# Patient Record
Sex: MALE | Race: WHITE | Employment: UNEMPLOYED | URBAN - METROPOLITAN AREA
[De-identification: names, ages, dates, MRNs, and addresses within clinical notes are randomized per-mention and may not be internally consistent; named-entity substitution may affect disease eponyms.]

---

## 2020-12-10 ENCOUNTER — APPOINTMENT (OUTPATIENT)
Dept: GENERAL RADIOLOGY | Age: 55
DRG: 137 | End: 2020-12-10
Payer: MEDICAID

## 2020-12-10 ENCOUNTER — HOSPITAL ENCOUNTER (INPATIENT)
Age: 55
LOS: 6 days | Discharge: HOME | DRG: 137 | End: 2020-12-16
Attending: EMERGENCY MEDICINE | Admitting: INTERNAL MEDICINE
Payer: MEDICAID

## 2020-12-10 PROBLEM — U07.1 COVID-19 VIRUS INFECTION: Status: ACTIVE | Noted: 2020-12-10

## 2020-12-10 PROBLEM — J96.01 ACUTE HYPOXEMIC RESPIRATORY FAILURE (HCC): Status: ACTIVE | Noted: 2020-12-10

## 2020-12-10 PROBLEM — J12.82 PNEUMONIA DUE TO COVID-19 VIRUS: Status: ACTIVE | Noted: 2020-12-10

## 2020-12-10 LAB
ABO/RH: NORMAL
ABSOLUTE EOS #: 0 K/UL (ref 0–0.44)
ABSOLUTE IMMATURE GRANULOCYTE: 0.21 K/UL (ref 0–0.3)
ABSOLUTE LYMPH #: 0.7 K/UL (ref 1.1–3.7)
ABSOLUTE MONO #: 0.49 K/UL (ref 0.1–1.2)
ALBUMIN SERPL-MCNC: 3.3 G/DL (ref 3.5–5.2)
ALBUMIN/GLOBULIN RATIO: 1 (ref 1–2.5)
ALP BLD-CCNC: 52 U/L (ref 40–129)
ALT SERPL-CCNC: 57 U/L (ref 5–41)
ANION GAP SERPL CALCULATED.3IONS-SCNC: 13 MMOL/L (ref 9–17)
ANTIBODY SCREEN: NEGATIVE
ARM BAND NUMBER: NORMAL
AST SERPL-CCNC: 67 U/L
BASOPHILS # BLD: 1 % (ref 0–2)
BASOPHILS ABSOLUTE: 0.07 K/UL (ref 0–0.2)
BILIRUB SERPL-MCNC: 0.4 MG/DL (ref 0.3–1.2)
BNP INTERPRETATION: NORMAL
BUN BLDV-MCNC: 11 MG/DL (ref 6–20)
BUN/CREAT BLD: ABNORMAL (ref 9–20)
C-REACTIVE PROTEIN: 102 MG/L (ref 0–5)
CALCIUM SERPL-MCNC: 8.7 MG/DL (ref 8.6–10.4)
CHLORIDE BLD-SCNC: 98 MMOL/L (ref 98–107)
CO2: 22 MMOL/L (ref 20–31)
CREAT SERPL-MCNC: 0.83 MG/DL (ref 0.7–1.2)
D-DIMER QUANTITATIVE: 1.15 MG/L FEU
DIFFERENTIAL TYPE: ABNORMAL
EOSINOPHILS RELATIVE PERCENT: 0 % (ref 1–4)
EXPIRATION DATE: NORMAL
FERRITIN: 1949 UG/L (ref 30–400)
FERRITIN: 1963 UG/L (ref 30–400)
FIBRINOGEN: 631 MG/DL (ref 140–420)
GFR AFRICAN AMERICAN: >60 ML/MIN
GFR NON-AFRICAN AMERICAN: >60 ML/MIN
GFR SERPL CREATININE-BSD FRML MDRD: ABNORMAL ML/MIN/{1.73_M2}
GFR SERPL CREATININE-BSD FRML MDRD: ABNORMAL ML/MIN/{1.73_M2}
GLUCOSE BLD-MCNC: 108 MG/DL (ref 70–99)
HCT VFR BLD CALC: 40.3 % (ref 40.7–50.3)
HEMOGLOBIN: 13.9 G/DL (ref 13–17)
IMMATURE GRANULOCYTES: 3 %
INR BLD: 1
LACTATE DEHYDROGENASE: 532 U/L (ref 135–225)
LACTIC ACID, WHOLE BLOOD: 1.5 MMOL/L (ref 0.7–2.1)
LACTIC ACID: NORMAL MMOL/L
LYMPHOCYTES # BLD: 10 % (ref 24–43)
MCH RBC QN AUTO: 30.8 PG (ref 25.2–33.5)
MCHC RBC AUTO-ENTMCNC: 34.5 G/DL (ref 28.4–34.8)
MCV RBC AUTO: 89.2 FL (ref 82.6–102.9)
MONOCYTES # BLD: 7 % (ref 3–12)
MORPHOLOGY: NORMAL
NRBC AUTOMATED: 0 PER 100 WBC
PARTIAL THROMBOPLASTIN TIME: 26.7 SEC (ref 20.5–30.5)
PDW BLD-RTO: 12.4 % (ref 11.8–14.4)
PLATELET # BLD: 236 K/UL (ref 138–453)
PLATELET ESTIMATE: ABNORMAL
PMV BLD AUTO: 8.6 FL (ref 8.1–13.5)
POTASSIUM SERPL-SCNC: 4.1 MMOL/L (ref 3.7–5.3)
PRO-BNP: 176 PG/ML
PROCALCITONIN: 0.1 NG/ML
PROTHROMBIN TIME: 10.2 SEC (ref 9–12)
RBC # BLD: 4.52 M/UL (ref 4.21–5.77)
RBC # BLD: ABNORMAL 10*6/UL
SEG NEUTROPHILS: 79 % (ref 36–65)
SEGMENTED NEUTROPHILS ABSOLUTE COUNT: 5.53 K/UL (ref 1.5–8.1)
SODIUM BLD-SCNC: 133 MMOL/L (ref 135–144)
TOTAL PROTEIN: 6.6 G/DL (ref 6.4–8.3)
TROPONIN INTERP: NORMAL
TROPONIN INTERP: NORMAL
TROPONIN T: NORMAL NG/ML
TROPONIN T: NORMAL NG/ML
TROPONIN, HIGH SENSITIVITY: 8 NG/L (ref 0–22)
TROPONIN, HIGH SENSITIVITY: 8 NG/L (ref 0–22)
WBC # BLD: 7 K/UL (ref 3.5–11.3)
WBC # BLD: ABNORMAL 10*3/UL

## 2020-12-10 PROCEDURE — 99254 IP/OBS CNSLTJ NEW/EST MOD 60: CPT | Performed by: INTERNAL MEDICINE

## 2020-12-10 PROCEDURE — U0002 COVID-19 LAB TEST NON-CDC: HCPCS

## 2020-12-10 PROCEDURE — 2500000003 HC RX 250 WO HCPCS: Performed by: INTERNAL MEDICINE

## 2020-12-10 PROCEDURE — 87899 AGENT NOS ASSAY W/OPTIC: CPT

## 2020-12-10 PROCEDURE — 84145 PROCALCITONIN (PCT): CPT

## 2020-12-10 PROCEDURE — 1200000000 HC SEMI PRIVATE

## 2020-12-10 PROCEDURE — 87449 NOS EACH ORGANISM AG IA: CPT

## 2020-12-10 PROCEDURE — 82728 ASSAY OF FERRITIN: CPT

## 2020-12-10 PROCEDURE — 6370000000 HC RX 637 (ALT 250 FOR IP): Performed by: CLINICAL NURSE SPECIALIST

## 2020-12-10 PROCEDURE — 93005 ELECTROCARDIOGRAM TRACING: CPT | Performed by: STUDENT IN AN ORGANIZED HEALTH CARE EDUCATION/TRAINING PROGRAM

## 2020-12-10 PROCEDURE — 84484 ASSAY OF TROPONIN QUANT: CPT

## 2020-12-10 PROCEDURE — 85384 FIBRINOGEN ACTIVITY: CPT

## 2020-12-10 PROCEDURE — U0003 INFECTIOUS AGENT DETECTION BY NUCLEIC ACID (DNA OR RNA); SEVERE ACUTE RESPIRATORY SYNDROME CORONAVIRUS 2 (SARS-COV-2) (CORONAVIRUS DISEASE [COVID-19]), AMPLIFIED PROBE TECHNIQUE, MAKING USE OF HIGH THROUGHPUT TECHNOLOGIES AS DESCRIBED BY CMS-2020-01-R: HCPCS

## 2020-12-10 PROCEDURE — 86140 C-REACTIVE PROTEIN: CPT

## 2020-12-10 PROCEDURE — 71045 X-RAY EXAM CHEST 1 VIEW: CPT

## 2020-12-10 PROCEDURE — 6360000002 HC RX W HCPCS: Performed by: CLINICAL NURSE SPECIALIST

## 2020-12-10 PROCEDURE — 83605 ASSAY OF LACTIC ACID: CPT

## 2020-12-10 PROCEDURE — 86900 BLOOD TYPING SEROLOGIC ABO: CPT

## 2020-12-10 PROCEDURE — XW033E5 INTRODUCTION OF REMDESIVIR ANTI-INFECTIVE INTO PERIPHERAL VEIN, PERCUTANEOUS APPROACH, NEW TECHNOLOGY GROUP 5: ICD-10-PCS | Performed by: INTERNAL MEDICINE

## 2020-12-10 PROCEDURE — 85025 COMPLETE CBC W/AUTO DIFF WBC: CPT

## 2020-12-10 PROCEDURE — 2580000003 HC RX 258: Performed by: INTERNAL MEDICINE

## 2020-12-10 PROCEDURE — 99285 EMERGENCY DEPT VISIT HI MDM: CPT

## 2020-12-10 PROCEDURE — 86901 BLOOD TYPING SEROLOGIC RH(D): CPT

## 2020-12-10 PROCEDURE — 80053 COMPREHEN METABOLIC PANEL: CPT

## 2020-12-10 PROCEDURE — 99222 1ST HOSP IP/OBS MODERATE 55: CPT | Performed by: INTERNAL MEDICINE

## 2020-12-10 PROCEDURE — 85730 THROMBOPLASTIN TIME PARTIAL: CPT

## 2020-12-10 PROCEDURE — 2580000003 HC RX 258: Performed by: CLINICAL NURSE SPECIALIST

## 2020-12-10 PROCEDURE — 83880 ASSAY OF NATRIURETIC PEPTIDE: CPT

## 2020-12-10 PROCEDURE — 85610 PROTHROMBIN TIME: CPT

## 2020-12-10 PROCEDURE — XW13325 TRANSFUSION OF CONVALESCENT PLASMA (NONAUTOLOGOUS) INTO PERIPHERAL VEIN, PERCUTANEOUS APPROACH, NEW TECHNOLOGY GROUP 5: ICD-10-PCS | Performed by: INTERNAL MEDICINE

## 2020-12-10 PROCEDURE — 86850 RBC ANTIBODY SCREEN: CPT

## 2020-12-10 PROCEDURE — 36415 COLL VENOUS BLD VENIPUNCTURE: CPT

## 2020-12-10 PROCEDURE — 83615 LACTATE (LD) (LDH) ENZYME: CPT

## 2020-12-10 PROCEDURE — 85379 FIBRIN DEGRADATION QUANT: CPT

## 2020-12-10 RX ORDER — ONDANSETRON 2 MG/ML
4 INJECTION INTRAMUSCULAR; INTRAVENOUS EVERY 6 HOURS PRN
Status: DISCONTINUED | OUTPATIENT
Start: 2020-12-10 | End: 2020-12-16 | Stop reason: HOSPADM

## 2020-12-10 RX ORDER — PROMETHAZINE HYDROCHLORIDE 12.5 MG/1
12.5 TABLET ORAL EVERY 6 HOURS PRN
Status: DISCONTINUED | OUTPATIENT
Start: 2020-12-10 | End: 2020-12-16 | Stop reason: HOSPADM

## 2020-12-10 RX ORDER — POTASSIUM CHLORIDE 7.45 MG/ML
10 INJECTION INTRAVENOUS PRN
Status: DISCONTINUED | OUTPATIENT
Start: 2020-12-10 | End: 2020-12-16 | Stop reason: HOSPADM

## 2020-12-10 RX ORDER — LEVOTHYROXINE SODIUM 0.1 MG/1
100 TABLET ORAL DAILY
Status: DISCONTINUED | OUTPATIENT
Start: 2020-12-10 | End: 2020-12-16 | Stop reason: HOSPADM

## 2020-12-10 RX ORDER — MAGNESIUM SULFATE 1 G/100ML
1 INJECTION INTRAVENOUS PRN
Status: DISCONTINUED | OUTPATIENT
Start: 2020-12-10 | End: 2020-12-16 | Stop reason: HOSPADM

## 2020-12-10 RX ORDER — DEXAMETHASONE SODIUM PHOSPHATE 10 MG/ML
6 INJECTION INTRAMUSCULAR; INTRAVENOUS DAILY
Status: DISCONTINUED | OUTPATIENT
Start: 2020-12-10 | End: 2020-12-16 | Stop reason: HOSPADM

## 2020-12-10 RX ORDER — 0.9 % SODIUM CHLORIDE 0.9 %
30 INTRAVENOUS SOLUTION INTRAVENOUS PRN
Status: DISCONTINUED | OUTPATIENT
Start: 2020-12-10 | End: 2020-12-16 | Stop reason: HOSPADM

## 2020-12-10 RX ORDER — NICOTINE 21 MG/24HR
1 PATCH, TRANSDERMAL 24 HOURS TRANSDERMAL DAILY PRN
Status: DISCONTINUED | OUTPATIENT
Start: 2020-12-10 | End: 2020-12-16 | Stop reason: HOSPADM

## 2020-12-10 RX ORDER — ACETAMINOPHEN 325 MG/1
650 TABLET ORAL EVERY 6 HOURS PRN
Status: DISCONTINUED | OUTPATIENT
Start: 2020-12-10 | End: 2020-12-16 | Stop reason: HOSPADM

## 2020-12-10 RX ORDER — ACETAMINOPHEN 650 MG/1
650 SUPPOSITORY RECTAL EVERY 6 HOURS PRN
Status: DISCONTINUED | OUTPATIENT
Start: 2020-12-10 | End: 2020-12-16 | Stop reason: HOSPADM

## 2020-12-10 RX ORDER — 0.9 % SODIUM CHLORIDE 0.9 %
20 INTRAVENOUS SOLUTION INTRAVENOUS ONCE
Status: COMPLETED | OUTPATIENT
Start: 2020-12-10 | End: 2020-12-10

## 2020-12-10 RX ORDER — POLYETHYLENE GLYCOL 3350 17 G/17G
17 POWDER, FOR SOLUTION ORAL DAILY PRN
Status: DISCONTINUED | OUTPATIENT
Start: 2020-12-10 | End: 2020-12-16 | Stop reason: HOSPADM

## 2020-12-10 RX ORDER — POTASSIUM CHLORIDE 20 MEQ/1
40 TABLET, EXTENDED RELEASE ORAL PRN
Status: DISCONTINUED | OUTPATIENT
Start: 2020-12-10 | End: 2020-12-16 | Stop reason: HOSPADM

## 2020-12-10 RX ORDER — SODIUM CHLORIDE 0.9 % (FLUSH) 0.9 %
10 SYRINGE (ML) INJECTION EVERY 12 HOURS SCHEDULED
Status: DISCONTINUED | OUTPATIENT
Start: 2020-12-10 | End: 2020-12-16 | Stop reason: HOSPADM

## 2020-12-10 RX ORDER — SODIUM CHLORIDE 0.9 % (FLUSH) 0.9 %
10 SYRINGE (ML) INJECTION PRN
Status: DISCONTINUED | OUTPATIENT
Start: 2020-12-10 | End: 2020-12-16 | Stop reason: HOSPADM

## 2020-12-10 RX ORDER — LEVOTHYROXINE SODIUM 0.1 MG/1
100 TABLET ORAL DAILY
COMMUNITY

## 2020-12-10 RX ORDER — GUAIFENESIN DEXTROMETHORPHAN HYDROBROMIDE ORAL SOLUTION 10; 100 MG/5ML; MG/5ML
5 SOLUTION ORAL EVERY 4 HOURS PRN
Status: DISCONTINUED | OUTPATIENT
Start: 2020-12-10 | End: 2020-12-16 | Stop reason: HOSPADM

## 2020-12-10 RX ADMIN — SODIUM CHLORIDE 20 ML: 9 INJECTION, SOLUTION INTRAVENOUS at 15:39

## 2020-12-10 RX ADMIN — ENOXAPARIN SODIUM 30 MG: 100 INJECTION SUBCUTANEOUS at 15:39

## 2020-12-10 RX ADMIN — LEVOTHYROXINE SODIUM 100 MCG: 100 TABLET ORAL at 15:38

## 2020-12-10 RX ADMIN — SODIUM CHLORIDE, PRESERVATIVE FREE 10 ML: 5 INJECTION INTRAVENOUS at 20:15

## 2020-12-10 RX ADMIN — REMDESIVIR 200 MG: 100 INJECTION, POWDER, LYOPHILIZED, FOR SOLUTION INTRAVENOUS at 20:30

## 2020-12-10 RX ADMIN — DEXAMETHASONE SODIUM PHOSPHATE 6 MG: 10 INJECTION INTRAMUSCULAR; INTRAVENOUS at 12:03

## 2020-12-10 RX ADMIN — ENOXAPARIN SODIUM 30 MG: 100 INJECTION SUBCUTANEOUS at 20:39

## 2020-12-10 ASSESSMENT — ENCOUNTER SYMPTOMS
EYE REDNESS: 0
VOMITING: 0
ABDOMINAL DISTENTION: 0
SORE THROAT: 1
APNEA: 0
EYE DISCHARGE: 0
NAUSEA: 0
SHORTNESS OF BREATH: 1
COUGH: 1
RHINORRHEA: 0
EYE ITCHING: 0
COLOR CHANGE: 0
ABDOMINAL PAIN: 0

## 2020-12-10 NOTE — CARE COORDINATION
Case Management Initial Discharge Plan  Mariluz Stanley,         + covid        Insurance Provider: CYNDY JOHNSONECIARAHUL BILLING    Discharge Planning    [facility] Ar[facility]ements:    [facility]      Receiving oral anticoagulation therapy? No      Potential Assistance Needed:   oxygen       Evaluation: no    Expected Discharge date:   12/14    Patient expects to be discharged to:   [facility]    Transportation provider: félix  Transportation arrangements needed for discharge: No    Readmission Risk              Risk of Unplanned Readmission:        10             Does patient have a readmission risk score greater than 14?: No  If yes, follow-up appointment must be made within 7 days of discharge.      Goals of Care: return to adl without shortness of breath       Discharge Plan: return to [facility]           Electronically signed by Bassem Root RN on 12/10/20 at 5:24 PM EST

## 2020-12-10 NOTE — ED PROVIDER NOTES
[facility]  ED     Emergency Department     Faculty Attestation    I performed a history and physical examination of the patient and discussed management with the resident. I reviewed the residents note and agree with the documented findings and plan of care. Any areas of disagreement are noted on the chart. I was personally present for the key portions of any procedures. I have documented in the chart those procedures where I was not present during the key portions. I have reviewed the emergency nurses triage note. I agree with the chief complaint, past medical history, past surgical history, allergies, medications, social and family history as documented unless otherwise noted below. For Physician Assistant/ Nurse Practitioner cases/documentation I have personally evaluated this patient and have completed at least one if not all key elements of the E/M (history, physical exam, and MDM). Additional findings are as noted. Patient brought in from longterm for shortness of breath. Patient states that he started not feeling well about 1 week ago but the shortness of breath was much worse this morning to the point where he thought he was going to pass out when he got out of bed today. Patient was satting in the mid [de-identified] on room air. He is now up into the mid 90s on a nonrebreather. Patient says he has not had any fevers but has just generally not been feeling well. He denies chest pain. Will get labs including a Covid swab. Will get a chest x-ray and plan to admit patient.       Lni Mccall MD  Attending Emergency  Physician              Ovidio Leslie MD  12/10/20 [phone]

## 2020-12-10 NOTE — ED NOTES
Pt presented to ED for shortness of breath and covid s/s x 1 week. Pt was tested for covid this am but not resulted. Pt denies chest pain. Pt denies cough. Pt states loss of smell. Pt states nausea. Pt denies emesis or diarrhea. Pt alert and oriented x 4. Pt in custody of Steven Gallego.      Musa Vasquez RN  12/10/20 3002

## 2020-12-10 NOTE — ED PROVIDER NOTES
on phone: Not on file     Gets together: Not on file     Attends Restorationism service: Not on file     Active member of club or organization: Not on file     Attends meetings of clubs or organizations: Not on file     Relationship status: Not on file    Intimate partner violence     Fear of current or ex partner: Not on file     Emotionally abused: Not on file     Physically abused: Not on file     Forced sexual activity: Not on file   Other Topics Concern    Not on file   Social History Narrative    Not on file       History reviewed. No pertinent family history. Allergies:  Patient has no known allergies. Home Medications:  Prior to Admission medications    Medication Sig Start Date End Date Taking? Authorizing Provider   levothyroxine (SYNTHROID) 100 MCG tablet Take 100 mcg by mouth Daily   Yes Historical Provider, MD       REVIEW OF SYSTEMS    (2-9 systems for level 4, 10 or more for level 5)      Review of Systems   Constitutional: Positive for chills and fever. HENT: Positive for sore throat. Negative for rhinorrhea. Eyes: Negative for redness, itching and visual disturbance. Respiratory: Positive for cough and shortness of breath. Cardiovascular: Positive for chest pain. Gastrointestinal: Negative for abdominal pain, nausea and vomiting. Musculoskeletal: Positive for arthralgias and myalgias. Allergic/Immunologic: Negative for environmental allergies and food allergies. Neurological: Positive for headaches. Negative for weakness and numbness. PHYSICAL EXAM   (up to 7 for level 4, 8 or more for level 5)      INITIAL VITALS:   /81   Pulse 87   Temp 97.7 °F (36.5 °C) (Oral)   Resp 24   Ht 5' 10\" (1.778 m)   Wt 181 lb (82.1 kg)   SpO2 90%   BMI 25.97 kg/m²     Physical Exam  Vitals signs and nursing note reviewed. Constitutional:       General: He is not in acute distress. Appearance: Normal appearance. He is well-developed.  He is not ill-appearing, toxic-appearing or diaphoretic. HENT:      Head: Normocephalic and atraumatic. Eyes:      General: No scleral icterus. Conjunctiva/sclera: Conjunctivae normal.   Neck:      Musculoskeletal: Neck supple. Cardiovascular:      Rate and Rhythm: Normal rate and regular rhythm. Pulmonary:      Effort: Respiratory distress (mild) present. Breath sounds: Normal breath sounds. No stridor. No wheezing, rhonchi or rales. Abdominal:      General: There is no distension. Palpations: Abdomen is soft. There is no mass. Tenderness: There is no abdominal tenderness. There is no guarding or rebound. Musculoskeletal:      Right lower leg: No edema. Left lower leg: No edema. Skin:     General: Skin is warm and dry. Findings: No rash (over exposed skin). Neurological:      General: No focal deficit present. Mental Status: He is alert and oriented to person, place, and time.    Psychiatric:         Mood and Affect: Mood normal.         Behavior: Behavior normal.         DIAGNOSTICS     PLAN (LABS / IMAGING / EKG):  Orders Placed This Encounter   Procedures    Legionella antigen, urine    Strep Pneumoniae Antigen    Culture, Respiratory, Sputum    XR CHEST PORTABLE    Troponin    COVID-19    Procalcitonin    C-Reactive Protein    Lactate Dehydrogenase    Ferritin    Lactic Acid, Plasma    Brain Natriuretic Peptide    CBC Auto Differential    Comprehensive Metabolic Panel w/ Reflex to MG    Troponin    TSH with Reflex    CBC    Protime-INR    Comprehensive Metabolic Panel w/ Reflex to MG    FERRITIN    HEPATIC FUNCTION PANEL    DIET GENERAL;    Vital signs per unit routine    Notify physician    Up with assistance    Daily weights    Intake and output    Verify informed consent    VITAL SIGNS PER TRANSFUSION PROTOCOL    TRANSFUSION REACTION MANAGEMENT    Full Code    Inpatient consult to Hospitalist    Consult to Infectious Disease    Pharmacy to Dose: Other - See Comments: Please start Remdesivir, thanks    Droplet Plus Isolation    Initiate Oxygen Therapy Protocol    Pulse Oximetry Spot Check    Pulse oximetry, continuous    Nasal Cannula oxygen    EKG 12 Lead    TYPE AND SCREEN    Prepare COVID-19 Convalescent Plasma, 2 Units    PATIENT STATUS (FROM ED OR OR/PROCEDURAL) Inpatient       MEDICATIONS ORDERED:  Orders Placed This Encounter   Medications    dextromethorphan-guaiFENesin (ROBITUSSIN-DM)  MG/5ML liquid 5 mL    OR Linked Order Group     acetaminophen (TYLENOL) tablet 650 mg     acetaminophen (TYLENOL) suppository 650 mg    dexamethasone (DECADRON) injection 6 mg    levothyroxine (SYNTHROID) tablet 100 mcg    sodium chloride flush 0.9 % injection 10 mL    sodium chloride flush 0.9 % injection 10 mL    OR Linked Order Group     potassium chloride (KLOR-CON M) extended release tablet 40 mEq     potassium bicarb-citric acid (EFFER-K) effervescent tablet 40 mEq     potassium chloride 10 mEq/100 mL IVPB (Peripheral Line)    magnesium sulfate 1 g in dextrose 5% 100 mL IVPB    OR Linked Order Group     acetaminophen (TYLENOL) tablet 650 mg     acetaminophen (TYLENOL) suppository 650 mg    polyethylene glycol (GLYCOLAX) packet 17 g    OR Linked Order Group     promethazine (PHENERGAN) tablet 12.5 mg     ondansetron (ZOFRAN) injection 4 mg    nicotine (NICODERM CQ) 21 MG/24HR 1 patch    enoxaparin (LOVENOX) injection 30 mg    0.9 % sodium chloride bolus    FOLLOWED BY Linked Order Group     remdesivir 200 mg in sodium chloride 0.9 % 250 mL IVPB      Order Specific Question:   Antimicrobial Indications      Answer:   Pneumonia (CAP)     remdesivir 100 mg in sodium chloride 0.9 % 250 mL IVPB      Order Specific Question:   Antimicrobial Indications      Answer:   Pneumonia (CAP)    0.9 % sodium chloride bolus    DISCONTD: remdesivir 200 mg in sodium chloride 0.9 % 250 mL IVPB     Order Specific Question:   Antimicrobial Indications     Answer: Other     Order Specific Question:   Other Abx Indication     Answer:   Covid 19 positive    DISCONTD: remdesivir 100 mg in sodium chloride 0.9 % 250 mL IVPB     Order Specific Question:   Antimicrobial Indications     Answer: Other     Order Specific Question:   Other Abx Indication     Answer:   Covid 19 positive    0.9 % sodium chloride bolus       DIAGNOSTIC RESULTS / EMERGENCYDEPARTMENT COURSE / MDM     LABS:  Results for orders placed or performed during the hospital encounter of 12/10/20   Troponin   Result Value Ref Range    Troponin, High Sensitivity 8 0 - 22 ng/L    Troponin T NOT REPORTED <0.03 ng/mL    Troponin Interp NOT REPORTED    COVID-19    Specimen: Other   Result Value Ref Range    SARS-CoV-2          SARS-CoV-2, Rapid DETECTED (A) Not Detected    Source . NASOPHARYNGEAL SWAB     SARS-CoV-2 PENDING    Protime-INR   Result Value Ref Range    Protime 10.2 9.0 - 12.0 sec    INR 1.0    APTT   Result Value Ref Range    PTT 26.7 20.5 - 30.5 sec   Fibrinogen   Result Value Ref Range    Fibrinogen 631 (H) 140 - 420 mg/dL   Procalcitonin   Result Value Ref Range    Procalcitonin 0.10 (H) <0.09 ng/mL   C-Reactive Protein   Result Value Ref Range    .0 (H) 0.0 - 5.0 mg/L   Lactate Dehydrogenase   Result Value Ref Range     (H) 135 - 225 U/L   Ferritin   Result Value Ref Range    Ferritin 1,949 (H) 30 - 400 ug/L   D-Dimer, Quantitative   Result Value Ref Range    D-Dimer, Quant 1.15 mg/L FEU   Lactic Acid, Plasma   Result Value Ref Range    Lactic Acid NOT REPORTED mmol/L    Lactic Acid, Whole Blood 1.5 0.7 - 2.1 mmol/L   Brain Natriuretic Peptide   Result Value Ref Range    Pro- <300 pg/mL    BNP Interpretation Pro-BNP Reference Range:    CBC Auto Differential   Result Value Ref Range    WBC 7.0 3.5 - 11.3 k/uL    RBC 4.52 4.21 - 5.77 m/uL    Hemoglobin 13.9 13.0 - 17.0 g/dL    Hematocrit 40.3 (L) 40.7 - 50.3 %    MCV 89.2 82.6 - 102.9 fL    MCH 30.8 25.2 - 33.5 pg    MCHC 34.5 28.4 - 34.8 g/dL    RDW 12.4 11.8 - 14.4 %    Platelets 281 945 - 664 k/uL    MPV 8.6 8.1 - 13.5 fL    NRBC Automated 0.0 0.0 per 100 WBC    Differential Type NOT REPORTED     WBC Morphology NOT REPORTED     RBC Morphology NOT REPORTED     Platelet Estimate NOT REPORTED     Immature Granulocytes 3 (H) 0 %    Seg Neutrophils 79 (H) 36 - 65 %    Lymphocytes 10 (L) 24 - 43 %    Monocytes 7 3 - 12 %    Eosinophils % 0 (L) 1 - 4 %    Basophils 1 0 - 2 %    Absolute Immature Granulocyte 0.21 0.00 - 0.30 k/uL    Segs Absolute 5.53 1.50 - 8.10 k/uL    Absolute Lymph # 0.70 (L) 1.10 - 3.70 k/uL    Absolute Mono # 0.49 0.10 - 1.20 k/uL    Absolute Eos # 0.00 0.00 - 0.44 k/uL    Basophils Absolute 0.07 0.00 - 0.20 k/uL    Morphology Normal    Comprehensive Metabolic Panel w/ Reflex to MG   Result Value Ref Range    Glucose 108 (H) 70 - 99 mg/dL    BUN 11 6 - 20 mg/dL    CREATININE 0.83 0.70 - 1.20 mg/dL    Bun/Cre Ratio NOT REPORTED 9 - 20    Calcium 8.7 8.6 - 10.4 mg/dL    Sodium 133 (L) 135 - 144 mmol/L    Potassium 4.1 3.7 - 5.3 mmol/L    Chloride 98 98 - 107 mmol/L    CO2 22 20 - 31 mmol/L    Anion Gap 13 9 - 17 mmol/L    Alkaline Phosphatase 52 40 - 129 U/L    ALT 57 (H) 5 - 41 U/L    AST 67 (H) <40 U/L    Total Bilirubin 0.40 0.3 - 1.2 mg/dL    Total Protein 6.6 6.4 - 8.3 g/dL    Alb 3.3 (L) 3.5 - 5.2 g/dL    Albumin/Globulin Ratio 1.0 1.0 - 2.5    GFR Non-African American >60 >60 mL/min    GFR African American >60 >60 mL/min    GFR Comment          GFR Staging NOT REPORTED    Troponin   Result Value Ref Range    Troponin, High Sensitivity 8 0 - 22 ng/L    Troponin T NOT REPORTED <0.03 ng/mL    Troponin Interp NOT REPORTED    FERRITIN   Result Value Ref Range    Ferritin 1,963 (H) 30 - 400 ug/L   TYPE AND SCREEN   Result Value Ref Range    Expiration Date 12/13/2020,2359     Arm Band Number BE [id number]     ABO/Rh O POSITIVE     Antibody Screen NEGATIVE        RADIOLOGY:  XR CHEST PORTABLE   Final Result   Diffuse bilateral ill-defined linear pulmonary opacities could represent   pulmonary edema or atypical pneumonia            EMERGENCY DEPARTMENT COURSE:         MDM: 24-year-old gentleman history of asthma presenting with Covid symptoms for approximate last week. Is an 8 and is likely been exposed to Covid virus. On exam he is nontoxic-appearing and in mild respiratory distress. He is hypoxic on room air down to the mid 80s when I took him off of his submental oxygen but improves quickly after replacing the nonrebreather. Vitals are otherwise unremarkable heart regular rate and rhythm, lungs are clear auscultation bilaterally abdomen soft and nontender. No lower extremity edema noted. Differential diagnosis includes ACS, pneumonia, Covid, CHF, among others. Plan is for cardiac work-up, likely admission. Lab work is consistent with Covid and is Covid swab was positive. Plan is for admission to [facility] for further evaluation and management of his Covid pneumonia. PROCEDURES:  none    CONSULTS:  IP CONSULT TO HOSPITALIST  IP CONSULT TO INFECTIOUS DISEASES  IP CONSULT TO PHARMACY    FINAL IMPRESSION      1. COVID-19          DISPOSITION / PLAN     DISPOSITION Admitted 12/10/2020 11:54:17 AM      PATIENT REFERRED TO:  No follow-up provider specified.     DISCHARGE MEDICATIONS:  Current Discharge Medication List          Jyothi Enamorado DO  Emergency Medicine Resident  [address] [facility]    (Please note that portions of this note were completed with a voice recognition program.  Efforts were made to edit thedictations but occasionally words are mis-transcribed.)       Jyothi Enamorado DO  Resident  12/10/20 8619

## 2020-12-10 NOTE — ED NOTES
Bed: 19  Expected date:   Expected time:   Means of arrival:   Comments:  [facility] [address] [facility]  12/10/20 1016

## 2020-12-11 LAB
ALBUMIN SERPL-MCNC: 3.2 G/DL (ref 3.5–5.2)
ALBUMIN/GLOBULIN RATIO: 0.9 (ref 1–2.5)
ALP BLD-CCNC: 53 U/L (ref 40–129)
ALT SERPL-CCNC: 54 U/L (ref 5–41)
ANION GAP SERPL CALCULATED.3IONS-SCNC: 12 MMOL/L (ref 9–17)
AST SERPL-CCNC: 57 U/L
BILIRUB SERPL-MCNC: 0.33 MG/DL (ref 0.3–1.2)
BUN BLDV-MCNC: 14 MG/DL (ref 6–20)
BUN/CREAT BLD: ABNORMAL (ref 9–20)
CALCIUM SERPL-MCNC: 8.8 MG/DL (ref 8.6–10.4)
CHLORIDE BLD-SCNC: 101 MMOL/L (ref 98–107)
CO2: 21 MMOL/L (ref 20–31)
CREAT SERPL-MCNC: 0.87 MG/DL (ref 0.7–1.2)
EKG ATRIAL RATE: 95 BPM
EKG P AXIS: 32 DEGREES
EKG P-R INTERVAL: 130 MS
EKG Q-T INTERVAL: 356 MS
EKG QRS DURATION: 74 MS
EKG QTC CALCULATION (BAZETT): 447 MS
EKG R AXIS: -5 DEGREES
EKG T AXIS: 17 DEGREES
EKG VENTRICULAR RATE: 95 BPM
GFR AFRICAN AMERICAN: >60 ML/MIN
GFR NON-AFRICAN AMERICAN: >60 ML/MIN
GFR SERPL CREATININE-BSD FRML MDRD: ABNORMAL ML/MIN/{1.73_M2}
GFR SERPL CREATININE-BSD FRML MDRD: ABNORMAL ML/MIN/{1.73_M2}
GLUCOSE BLD-MCNC: 129 MG/DL (ref 70–99)
HCT VFR BLD CALC: 41.5 % (ref 40.7–50.3)
HEMOGLOBIN: 13.5 G/DL (ref 13–17)
INR BLD: 1
LEGIONELLA PNEUMOPHILIA AG, URINE: NEGATIVE
MCH RBC QN AUTO: 30.5 PG (ref 25.2–33.5)
MCHC RBC AUTO-ENTMCNC: 32.5 G/DL (ref 28.4–34.8)
MCV RBC AUTO: 93.7 FL (ref 82.6–102.9)
NRBC AUTOMATED: 0 PER 100 WBC
PDW BLD-RTO: 12.3 % (ref 11.8–14.4)
PLATELET # BLD: 286 K/UL (ref 138–453)
PMV BLD AUTO: 8.8 FL (ref 8.1–13.5)
POTASSIUM SERPL-SCNC: 4.7 MMOL/L (ref 3.7–5.3)
PROTHROMBIN TIME: 10.4 SEC (ref 9–12)
RBC # BLD: 4.43 M/UL (ref 4.21–5.77)
SODIUM BLD-SCNC: 134 MMOL/L (ref 135–144)
SOURCE: NORMAL
STREP PNEUMONIAE ANTIGEN: NEGATIVE
TOTAL PROTEIN: 6.7 G/DL (ref 6.4–8.3)
TSH SERPL DL<=0.05 MIU/L-ACNC: 1.18 MIU/L (ref 0.3–5)
WBC # BLD: 5.6 K/UL (ref 3.5–11.3)

## 2020-12-11 PROCEDURE — 2580000003 HC RX 258: Performed by: INTERNAL MEDICINE

## 2020-12-11 PROCEDURE — 2700000000 HC OXYGEN THERAPY PER DAY

## 2020-12-11 PROCEDURE — 87449 NOS EACH ORGANISM AG IA: CPT

## 2020-12-11 PROCEDURE — 94761 N-INVAS EAR/PLS OXIMETRY MLT: CPT

## 2020-12-11 PROCEDURE — 87899 AGENT NOS ASSAY W/OPTIC: CPT

## 2020-12-11 PROCEDURE — 99233 SBSQ HOSP IP/OBS HIGH 50: CPT | Performed by: INTERNAL MEDICINE

## 2020-12-11 PROCEDURE — 85027 COMPLETE CBC AUTOMATED: CPT

## 2020-12-11 PROCEDURE — 2500000003 HC RX 250 WO HCPCS: Performed by: INTERNAL MEDICINE

## 2020-12-11 PROCEDURE — 1200000000 HC SEMI PRIVATE

## 2020-12-11 PROCEDURE — 84443 ASSAY THYROID STIM HORMONE: CPT

## 2020-12-11 PROCEDURE — 2580000003 HC RX 258: Performed by: CLINICAL NURSE SPECIALIST

## 2020-12-11 PROCEDURE — 80053 COMPREHEN METABOLIC PANEL: CPT

## 2020-12-11 PROCEDURE — 6370000000 HC RX 637 (ALT 250 FOR IP): Performed by: CLINICAL NURSE SPECIALIST

## 2020-12-11 PROCEDURE — 36415 COLL VENOUS BLD VENIPUNCTURE: CPT

## 2020-12-11 PROCEDURE — 6360000002 HC RX W HCPCS: Performed by: CLINICAL NURSE SPECIALIST

## 2020-12-11 PROCEDURE — 85610 PROTHROMBIN TIME: CPT

## 2020-12-11 RX ORDER — 0.9 % SODIUM CHLORIDE 0.9 %
20 INTRAVENOUS SOLUTION INTRAVENOUS ONCE
Status: COMPLETED | OUTPATIENT
Start: 2020-12-11 | End: 2020-12-11

## 2020-12-11 RX ORDER — 0.9 % SODIUM CHLORIDE 0.9 %
20 INTRAVENOUS SOLUTION INTRAVENOUS ONCE
Status: DISCONTINUED | OUTPATIENT
Start: 2020-12-11 | End: 2020-12-11

## 2020-12-11 RX ADMIN — SODIUM CHLORIDE 20 ML: 0.9 INJECTION, SOLUTION INTRAVENOUS at 03:30

## 2020-12-11 RX ADMIN — DEXAMETHASONE SODIUM PHOSPHATE 6 MG: 10 INJECTION INTRAMUSCULAR; INTRAVENOUS at 08:44

## 2020-12-11 RX ADMIN — SODIUM CHLORIDE, PRESERVATIVE FREE 10 ML: 5 INJECTION INTRAVENOUS at 20:27

## 2020-12-11 RX ADMIN — ENOXAPARIN SODIUM 30 MG: 100 INJECTION SUBCUTANEOUS at 08:43

## 2020-12-11 RX ADMIN — ENOXAPARIN SODIUM 30 MG: 100 INJECTION SUBCUTANEOUS at 20:27

## 2020-12-11 RX ADMIN — REMDESIVIR 100 MG: 100 INJECTION, POWDER, LYOPHILIZED, FOR SOLUTION INTRAVENOUS at 20:28

## 2020-12-11 RX ADMIN — SODIUM CHLORIDE, PRESERVATIVE FREE 10 ML: 5 INJECTION INTRAVENOUS at 08:44

## 2020-12-11 RX ADMIN — LEVOTHYROXINE SODIUM 100 MCG: 100 TABLET ORAL at 05:37

## 2020-12-11 ASSESSMENT — ENCOUNTER SYMPTOMS
EYE ITCHING: 0
APNEA: 0
COUGH: 1
EYE DISCHARGE: 0
ABDOMINAL DISTENTION: 0
SHORTNESS OF BREATH: 1
COLOR CHANGE: 0

## 2020-12-11 NOTE — PROGRESS NOTES
[facility] -   Infectious diseases evaluation  admission date 12/10/2020    reason for consultation:   \Covid related respiratory infection    Impression :   Current:  · Covid, pneumonia  · Elevated ferritin  · Change patient  Discussion / summary of stay / plan of care   ·   Recommendations   · Start remdesivir 12/10 until 12/14  · Follow LFTs  · Give 2 units of plasma 12/10, consented and ordered 12/10  · Follow ferritin progress    Infection Control Recommendations   · Strang Precautions  · Contact Isolation   · Airborne isolation  · Droplet Isolation      Antimicrobial Stewardship Recommendations   · Simplification of therapy  · Targeted therapy      Coordination ofOutpatient Care:   · Estimated Length of IV antimicrobials:  · Patient will need Midline / picc Catheter Insertion:   · Patient will need SNF:  · Patient will need outpatient wound care:     History of Present Illness:   Initial history:  Alverta Skiff is a 60y.o.-year-old male presented over the last 1 week before admission with flulike symptoms muscle aches fevers cough shortness of breath not feeling good. Patient is from residential, history of asthma  Brought by EMS and his saturation was 80, chest x-ray showed bilateral infiltrates suggestive of Covid.   Inflammatory markers elevated mostly the ferritin but his creatinine is normal    Patient states he lost the taste of the food, he has no appetite, has no diarrhea    Interval changes  12/11/2020   Patient Vitals for the past 8 hrs:   BP Temp Temp src Pulse Resp SpO2 Weight   12/11/20 1221 -- -- -- -- 20 92 % --   12/11/20 1201 134/87 97.3 °F (36.3 °C) Oral 97 24 94 % --   12/11/20 1131 -- -- -- -- -- 94 % --   12/11/20 0906 -- -- -- -- -- 91 % --   12/11/20 0841 133/84 98.4 °F (36.9 °C) Oral 87 28 (!) 86 % --   12/11/20 0538 -- -- -- -- -- -- 183 lb 14.4 oz (83.4 kg)   12/11/20 0530 137/80 98.4 °F (36.9 °C) Oral 79 22 92 % --   12/11/20 0515 123/77 98 °F (36.7 °C) Oral 78 28 92 % --   12/11/20 0500 122/75 98.3 °F (36.8 °C) Oral 78 28 92 % --   92% saturation 100% FiO2 high flow 35 L  Desaturating on and off,  Received 2 units of plasma,  No new inflammatory markers   normal LFTs      Summary of relevant labs:  Labs:  ALT 57  AST 67  Ferritin 1,949    Procalcitonin 0.10       WBC 7  Micro:  Covid +12/10  Imaging:  Chest x-ray 12/10  Diffuse bilateral ill-defined linear pulmonary opacities could represent   pulmonary edema or atypical pneumonia       I have personally reviewed the past medical history, past surgical history, medications, social history, and family history, and I haveupdated the database accordingly. Allergies:   Patient has no known allergies. Review of Systems:     Review of Systems   Constitutional: Positive for fatigue. Negative for activity change, appetite change and fever. HENT: Negative for congestion. Eyes: Negative for discharge and itching. Respiratory: Positive for cough and shortness of breath. Negative for apnea. Cardiovascular: Negative for chest pain. Gastrointestinal: Negative for abdominal distention. Endocrine: Negative for heat intolerance. Genitourinary: Negative for dysuria and flank pain. Musculoskeletal: Negative for arthralgias. Skin: Negative for color change. Allergic/Immunologic: Negative for immunocompromised state. Neurological: Negative for dizziness. Hematological: Negative for adenopathy. Psychiatric/Behavioral: Negative for agitation. Physical Examination :       Physical Exam  Constitutional:       General: He is not in acute distress. Appearance: Normal appearance. He is ill-appearing. HENT:      Head: Normocephalic and atraumatic. Nose: Nose normal.      Mouth/Throat:      Mouth: Mucous membranes are moist.   Eyes:      General: No scleral icterus.      Conjunctiva/sclera: Conjunctivae normal.      Pupils: Pupils are equal, round, and reactive to light. Neck:      Musculoskeletal: Neck supple. No neck rigidity. Cardiovascular:      Rate and Rhythm: Normal rate and regular rhythm. Heart sounds: Normal heart sounds. No murmur. Pulmonary:      Effort: Respiratory distress present. Breath sounds: Normal breath sounds. Abdominal:      General: There is no distension. Palpations: Abdomen is soft. Genitourinary:     Comments: No Mccall  Musculoskeletal:         General: No swelling or deformity. Skin:     General: Skin is warm. Coloration: Skin is not jaundiced. Neurological:      General: No focal deficit present. Mental Status: He is alert. Mental status is at baseline. Psychiatric:         Mood and Affect: Mood normal.         Thought Content:  Thought content normal.         Past Medical History:     Past Medical History:   Diagnosis Date    Asthma     Hypothyroidism        Past Surgical  History:     Past Surgical History:   Procedure Laterality Date    NOSE SURGERY      Nasal polyps       Medications:      dexamethasone  6 mg Intravenous Daily    levothyroxine  100 mcg Oral Daily    sodium chloride flush  10 mL Intravenous 2 times per day    enoxaparin  30 mg Subcutaneous BID    remdesivir IVPB  100 mg Intravenous Q24H       Social History:     Social History     Socioeconomic History    Marital status: Single     Spouse name: Not on file    Number of children: Not on file    Years of education: Not on file    Highest education level: Not on file   Occupational History    Not on file   Social Needs    Financial resource strain: Not on file    Food insecurity     Worry: Not on file     Inability: Not on file    Transportation needs     Medical: Not on file     Non-medical: Not on file   Tobacco Use    Smoking status: Unknown If Ever Smoked    Smokeless tobacco: Never Used   Substance and Sexual Activity    Alcohol use: Not Currently    Drug use: Not on file    Sexual activity: Not on file Lifestyle    Physical activity     Days per week: Not on file     Minutes per session: Not on file    Stress: Not on file   Relationships    Social connections     Talks on phone: Not on file     Gets together: Not on file     Attends [employer] service: Not on file     Active member of club or organization: Not on file     Attends meetings of clubs or organizations: Not on file     Relationship status: Not on file    Intimate partner violence     Fear of current or ex partner: Not on file     Emotionally abused: Not on file     Physically abused: Not on file     Forced sexual activity: Not on file   Other Topics Concern    Not on file   Social History Narrative    Not on file       Family History:     Family History   Problem Relation Age of Onset    No Known Problems Mother     No Known Problems Father       Medical Decision Making:   I have independently reviewed/ordered the following labs:    CBC with Differential:   Recent Labs     12/10/20  1038 12/11/20  0428   WBC 7.0 5.6   HGB 13.9 13.5   HCT 40.3* 41.5    286   LYMPHOPCT 10*  --    MONOPCT 7  --      BMP:  Recent Labs     12/10/20  1038 12/11/20  0428   * 134*   K 4.1 4.7   CL 98 101   CO2 22 21   BUN 11 14   CREATININE 0.83 0.87     Hepatic Function Panel:   Recent Labs     12/10/20  1038 12/11/20  0428   PROT 6.6 6.7   LABALBU 3.3* 3.2*   BILITOT 0.40 0.33   ALKPHOS 52 53   ALT 57* 54*   AST 67* 57*     No results for input(s): RPR in the last 72 hours. No results for input(s): HIV in the last 72 hours. No results for input(s): BC in the last 72 hours. Lab Results   Component Value Date    CREATININE 0.87 12/11/2020    GLUCOSE 129 12/11/2020       Detailed results: Thank you for allowing us to participate in the care of this patient. Please call with questions.     This note is created with the assistance of a speech recognition program.  While intending to generate adocument that actually reflects the content of the visit, the document can still have some errors including those of syntax and sound a like substitutions which may escape proof reading. It such instances, actual meaningcan be extrapolated by contextual diversion.     Ann Tim MD  Office: [phone]  Perfect serve / office [phone]        '

## 2020-12-11 NOTE — PROGRESS NOTES
Daily    sodium chloride flush  10 mL Intravenous 2 times per day    enoxaparin  30 mg Subcutaneous BID    remdesivir IVPB  100 mg Intravenous Q24H     Continuous Infusions:   PRN Meds: dextromethorphan-guaiFENesin, acetaminophen **OR** acetaminophen, sodium chloride flush, potassium chloride **OR** potassium alternative oral replacement **OR** potassium chloride, magnesium sulfate, acetaminophen **OR** acetaminophen, polyethylene glycol, promethazine **OR** ondansetron, nicotine, sodium chloride, sodium chloride    Data:     Past Medical History:   has a past medical history of Asthma and Hypothyroidism. Social History:   has an unknown smoking status. He has never used smokeless tobacco. He reports previous alcohol use. Family History:   Family History   Problem Relation Age of Onset    No Known Problems Mother     No Known Problems Father        Vitals:  /84   Pulse 87   Temp 98.4 °F (36.9 °C) (Oral)   Resp 28   Ht 5' 10\" (1.778 m)   Wt 183 lb 14.4 oz (83.4 kg)   SpO2 91%   BMI 26.39 kg/m²   Temp (24hrs), Av.4 °F (36.9 °C), Min:97.7 °F (36.5 °C), Max:99.1 °F (37.3 °C)    No results for input(s): POCGLU in the last 72 hours. I/O (24Hr):     Intake/Output Summary (Last 24 hours) at 2020 0931  Last data filed at 2020 0540  Gross per 24 hour   Intake 942.08 ml   Output 850 ml   Net 92.08 ml       Labs:  Hematology:  Recent Labs     12/10/20  1038 20  0428   WBC 7.0 5.6   RBC 4.52 4.43   HGB 13.9 13.5   HCT 40.3* 41.5   MCV 89.2 93.7   MCH 30.8 30.5   MCHC 34.5 32.5   RDW 12.4 12.3    286   MPV 8.6 8.8   .0*  --    INR 1.0 1.0   DDIMER 1.15  --      Chemistry:  Recent Labs     12/10/20  1038 12/10/20  1206 20  0428   *  --  134*   K 4.1  --  4.7   CL 98  --  101   CO2 22  --  21   GLUCOSE 108*  --  129*   BUN 11  --  14   CREATININE 0.83  --  0.87   ANIONGAP 13  --  12   LABGLOM >60  --  >60   GFRAA >60  --  >60   CALCIUM 8.7  --  8.8   PROBNP

## 2020-12-11 NOTE — FLOWSHEET NOTE
Assessment  I had the pleasure of visiting with Mr. Claudia Abad via telephone/window as he was awake and sitting up in his isolation room. After checking with the guards at the door, a cell phone was taken in by the RN as pt is unable to have access to a phone. Intervention  Writer introduced self and pt was able to engage in conversation. Pt has no Islam affiliation. Pt is unable to contact friends or family at this time d/t incarceration. Writer explained POA/LW to pt, as he had no EC or POA listed. Outcome  Pt was grateful for visit. Pt completed POA over the phone. Paperwork is awaiting next trip into room by RN for signature. Chaplains will remain available to offer spiritual and emotional support as needed. 12/11/20 1141   Encounter Summary   Services provided to: Patient   Referral/Consult From: Rounding   Support System Unknown   Continue Visiting   (12/11)   Complexity of Encounter Moderate   Length of Encounter 30 minutes   Advance Care Planning Yes   Routine   Type Initial   Assessment Calm; Approachable   Intervention Active listening   Outcome Expressed gratitude;Receptive   Advance Directives (For Healthcare)   Advance Directives Documents explained; Healthcare power of attornery completed

## 2020-12-11 NOTE — PROGRESS NOTES
Writer spoke with skilled nursing facility to give update. Questions and concerns addressed. Will continue to monitor.

## 2020-12-11 NOTE — PROGRESS NOTES
Second unit of plasma started. RN to stay in room during first 15 min of transfusion. Patient's vitals stable and cycling. No adverse reaction. Patient resting comfortably in bed. Continuing to monitor.

## 2020-12-11 NOTE — H&P
[facility]  Office: [address], DO, Tommy Zamudio, DO, Quinten Signs, DO, Desire Coombs Blood, DO, Xander Melchor MD, Mignon Meckel, MD, Ynes Smith MD, Caterina Lyn MD, Michelle Adams MD, Fernando Bradley MD, Larissa Duncan MD, Florentino Ponce MD, Joanie Szymanski MD, Gricelda Rosas, DO, Jena Lazaro MD, Ronny Mitchell MD, [place], DO, Justin Ruiz MD,  Ramin Crane DO, Remington Winston MD, Michelle Doll MD, Sadia Vela, [facility], [facility] Alison, CNP, Nahomi Stroud, CNP, Himanshu Giraldo, CNS, Maxine Casper, CNP, Susan Sharma, CNP, Dominik Tijerina, CNP, Melissa Guillaume, CNP, Bipin Montoya, CNP, Robin King PA-C, Skylar Bower, [facility], Ana Ritter, CNP, Nora Rodriguez, CNP, Martha Robbins, CNP, Eleno Echols, CNP, Caryle Poet, [facility]    HISTORY AND PHYSICAL EXAMINATION            Date:   12/10/2020  Patient name:  Alex Villa  Date of admission:  12/10/2020 10:16 AM  MRN:   [id number]  Account:  [de-identified]  YOB: 1965  PCP:    No primary care provider on file. Room:   2009/2009-01  Code Status:    Full Code    Chief Complaint:     Chief Complaint   Patient presents with    Shortness of Breath       History Obtained From:     patient    History of Present Illness:     Alex Villa is a 60 y.o. Non-/non  male who presents with Shortness of Breath   and is admitted to the hospital for the management of Pneumonia due to COVID-19 virus. This is a 49-year-old white male who is currently incarcerated in the local detention system. He presents with flulike symptoms with myalgias, fevers and chills, cough and shortness of breath and was concern for COVID-19 due to recent outbreak in the detention system. He is found to be markedly hypoxic and required nonrebreather mask initially for stabilization in the emergency department.   He was tested for Covid and has tested positive and is being admitted for further management. After initial treatment with high flow oxygen he has improved in the wean down to nasal cannula oxygen. He feels less short of breath at this point. He denies any other acute complaints. No treatment was tried prior to arrival.    Past Medical History:     Past Medical History:   Diagnosis Date    Asthma     Hypothyroidism         Past Surgical History:     Past Surgical History:   Procedure Laterality Date    NOSE SURGERY      Nasal polyps        Medications Prior to Admission:     Prior to Admission medications    Medication Sig Start Date End Date Taking? Authorizing Provider   levothyroxine (SYNTHROID) 100 MCG tablet Take 100 mcg by mouth Daily   Yes Historical Provider, MD        Allergies:     Patient has no known allergies. Social History:     Tobacco:    has an unknown smoking status. He has never used smokeless tobacco.  Alcohol:      reports previous alcohol use. Drug Use:  has no history on file for drug. Family History:     Family History   Problem Relation Age of Onset    No Known Problems Mother     No Known Problems Father        Review of Systems:     Positive and Negative as described in HPI.     CONSTITUTIONAL: Positive for fevers, chills, fatigue, negative for sweats, weight loss  HEENT:  negative for vision, hearing changes, runny nose, throat pain  RESPIRATORY: Positive for shortness of breath, cough, congestion, negative for wheezing  CARDIOVASCULAR:  negative for chest pain, palpitations  GASTROINTESTINAL:  negative for nausea, vomiting, diarrhea, constipation, change in bowel habits, abdominal pain   GENITOURINARY:  negative for difficulty of urination, burning with urination, frequency   INTEGUMENT:  negative for rash, skin lesions, easy bruising   HEMATOLOGIC/LYMPHATIC:  negative for swelling/edema   ALLERGIC/IMMUNOLOGIC:  negative for urticaria , itching  ENDOCRINE:  negative increase in drinking, increase in urination, hot or cold intolerance  MUSCULOSKELETAL:  negative joint pains, swelling of joints, positive for myalgias  NEUROLOGICAL:  negative for headaches, dizziness, lightheadedness, numbness, pain, tingling extremities  BEHAVIOR/PSYCH:  negative for depression, anxiety    Physical Exam:   /81   Pulse 94   Temp 98.2 °F (36.8 °C) (Oral)   Resp 23   Ht 5' 10\" (1.778 m)   Wt 181 lb (82.1 kg)   SpO2 92%   BMI 25.97 kg/m²   Temp (24hrs), Av.6 °F (37 °C), Min:97.7 °F (36.5 °C), Max:99.1 °F (37.3 °C)    No results for input(s): POCGLU in the last 72 hours.     Intake/Output Summary (Last 24 hours) at 12/10/2020 2331  Last data filed at 12/10/2020 2020  Gross per 24 hour   Intake 673.33 ml   Output --   Net 673.33 ml       General Appearance: alert, acutely ill appearing, and in no acute distress  Mental status: oriented to person, place, and time  Head: normocephalic, atraumatic  Eye: no icterus, redness, pupils equal and reactive, extraocular eye movements intact, conjunctiva clear  Ear: normal external ear, no discharge, hearing intact  Nose: no drainage noted  Mouth: mucous membranes moist  Neck: supple, no carotid bruits, thyroid not palpable  Lungs: Bilateral equal air entry, clear to ausculation, no wheezing, rales or rhonchi, normal effort, diminished throughout  Cardiovascular: normal rate, regular rhythm, no murmur, gallop, rub  Abdomen: Soft, nontender, nondistended, normal bowel sounds, no hepatomegaly or splenomegaly  Neurologic: There are no new focal motor or sensory deficits, normal muscle tone and bulk, no abnormal sensation, normal speech, cranial nerves II through XII grossly intact  Skin: No gross lesions, rashes, bruising or bleeding on exposed skin area  Extremities: peripheral pulses palpable, no pedal edema or calf pain with palpation  Psych: normal affect    Investigations:      Laboratory Testing:  Recent Results (from the past 24 hour(s))   EKG 12 Lead    Collection Time: 12/10/20 10:26 AM   Result Value Ref Range    Ventricular Rate 95 BPM    Atrial Rate 95 BPM    P-R Interval 130 ms    QRS Duration 74 ms    Q-T Interval 356 ms    QTc Calculation (Bazett) 447 ms    P Axis 32 degrees    R Axis -5 degrees    T Axis 17 degrees   Protime-INR    Collection Time: 12/10/20 10:38 AM   Result Value Ref Range    Protime 10.2 9.0 - 12.0 sec    INR 1.0    APTT    Collection Time: 12/10/20 10:38 AM   Result Value Ref Range    PTT 26.7 20.5 - 30.5 sec   Fibrinogen    Collection Time: 12/10/20 10:38 AM   Result Value Ref Range    Fibrinogen 631 (H) 140 - 420 mg/dL   Procalcitonin    Collection Time: 12/10/20 10:38 AM   Result Value Ref Range    Procalcitonin 0.10 (H) <0.09 ng/mL   C-Reactive Protein    Collection Time: 12/10/20 10:38 AM   Result Value Ref Range    .0 (H) 0.0 - 5.0 mg/L   Lactate Dehydrogenase    Collection Time: 12/10/20 10:38 AM   Result Value Ref Range     (H) 135 - 225 U/L   Ferritin    Collection Time: 12/10/20 10:38 AM   Result Value Ref Range    Ferritin 1,949 (H) 30 - 400 ug/L   D-Dimer, Quantitative    Collection Time: 12/10/20 10:38 AM   Result Value Ref Range    D-Dimer, Quant 1.15 mg/L FEU   Lactic Acid, Plasma    Collection Time: 12/10/20 10:38 AM   Result Value Ref Range    Lactic Acid NOT REPORTED mmol/L    Lactic Acid, Whole Blood 1.5 0.7 - 2.1 mmol/L   Brain Natriuretic Peptide    Collection Time: 12/10/20 10:38 AM   Result Value Ref Range    Pro- <300 pg/mL    BNP Interpretation Pro-BNP Reference Range:    CBC Auto Differential    Collection Time: 12/10/20 10:38 AM   Result Value Ref Range    WBC 7.0 3.5 - 11.3 k/uL    RBC 4.52 4.21 - 5.77 m/uL    Hemoglobin 13.9 13.0 - 17.0 g/dL    Hematocrit 40.3 (L) 40.7 - 50.3 %    MCV 89.2 82.6 - 102.9 fL    MCH 30.8 25.2 - 33.5 pg    MCHC 34.5 28.4 - 34.8 g/dL    RDW 12.4 11.8 - 14.4 %    Platelets 913 967 - 336 k/uL    MPV 8.6 8.1 - 13.5 fL    NRBC Automated 0.0 0.0 per 100 WBC    Differential Type NOT REPORTED     WBC Morphology NOT REPORTED     RBC Morphology NOT REPORTED     Platelet Estimate NOT REPORTED     Immature Granulocytes 3 (H) 0 %    Seg Neutrophils 79 (H) 36 - 65 %    Lymphocytes 10 (L) 24 - 43 %    Monocytes 7 3 - 12 %    Eosinophils % 0 (L) 1 - 4 %    Basophils 1 0 - 2 %    Absolute Immature Granulocyte 0.21 0.00 - 0.30 k/uL    Segs Absolute 5.53 1.50 - 8.10 k/uL    Absolute Lymph # 0.70 (L) 1.10 - 3.70 k/uL    Absolute Mono # 0.49 0.10 - 1.20 k/uL    Absolute Eos # 0.00 0.00 - 0.44 k/uL    Basophils Absolute 0.07 0.00 - 0.20 k/uL    Morphology Normal    Comprehensive Metabolic Panel w/ Reflex to MG    Collection Time: 12/10/20 10:38 AM   Result Value Ref Range    Glucose 108 (H) 70 - 99 mg/dL    BUN 11 6 - 20 mg/dL    CREATININE 0.83 0.70 - 1.20 mg/dL    Bun/Cre Ratio NOT REPORTED 9 - 20    Calcium 8.7 8.6 - 10.4 mg/dL    Sodium 133 (L) 135 - 144 mmol/L    Potassium 4.1 3.7 - 5.3 mmol/L    Chloride 98 98 - 107 mmol/L    CO2 22 20 - 31 mmol/L    Anion Gap 13 9 - 17 mmol/L    Alkaline Phosphatase 52 40 - 129 U/L    ALT 57 (H) 5 - 41 U/L    AST 67 (H) <40 U/L    Total Bilirubin 0.40 0.3 - 1.2 mg/dL    Total Protein 6.6 6.4 - 8.3 g/dL    Alb 3.3 (L) 3.5 - 5.2 g/dL    Albumin/Globulin Ratio 1.0 1.0 - 2.5    GFR Non-African American >60 >60 mL/min    GFR African American >60 >60 mL/min    GFR Comment          GFR Staging NOT REPORTED    Troponin    Collection Time: 12/10/20 10:38 AM   Result Value Ref Range    Troponin, High Sensitivity 8 0 - 22 ng/L    Troponin T NOT REPORTED <0.03 ng/mL    Troponin Interp NOT REPORTED    COVID-19    Collection Time: 12/10/20 10:41 AM    Specimen: Other   Result Value Ref Range    SARS-CoV-2          SARS-CoV-2, Rapid DETECTED (A) Not Detected    Source . NASOPHARYNGEAL SWAB     SARS-CoV-2 PENDING    TYPE AND SCREEN    Collection Time: 12/10/20 10:41 AM   Result Value Ref Range    Expiration Date 12/13/2020,2712     Arm Band Number [id number]     ABO/Rh O POSITIVE     Antibody Screen NEGATIVE Troponin    Collection Time: 12/10/20 12:06 PM   Result Value Ref Range    Troponin, High Sensitivity 8 0 - 22 ng/L    Troponin T NOT REPORTED <0.03 ng/mL    Troponin Interp NOT REPORTED    Prepare COVID-19 Convalescent Plasma, 2 Units    Collection Time: 12/10/20  1:30 PM   Result Value Ref Range    Unit Number [id number]     Product Code Fresh Plasma     Unit Divison 00     Dispense Status ISSUED     Transfusion Status OK TO TRANSFUSE    FERRITIN    Collection Time: 12/10/20  2:35 PM   Result Value Ref Range    Ferritin 1,963 (H) 30 - 400 ug/L       Imaging/Diagnostics:  Xr Chest Portable    Result Date: 12/10/2020  Diffuse bilateral ill-defined linear pulmonary opacities could represent pulmonary edema or atypical pneumonia       Assessment :      Hospital Problems           Last Modified POA    * (Principal) Pneumonia due to COVID-19 virus 12/10/2020 Yes    Hypothyroidism 12/10/2020 Yes    Asthma 12/10/2020 Yes    Acute hypoxemic respiratory failure ([address] due to COVID-19 pneumonia 12/10/2020 Yes          Plan:     Patient status inpatient in the Progressive Unit/Step down    1. Inpatient admission  2. Oxygen and aerosols as needed  3. Infectious disease evaluation  4. IV remdesivir as well as convalescent plasma  5. Decadron as ordered  6. Trend labs  7. GI and DVT prophylaxis  8. Continue home Synthroid dose  9. Activity as tolerated  10. Tylenol as needed for fever  11. Symptomatic management  12. Correct electrolytes as needed    Consultations:   IP CONSULT TO HOSPITALIST  IP CONSULT TO INFECTIOUS DISEASES  IP CONSULT TO PHARMACY     Patient is admitted as inpatient status because of co-morbidities listed above, severity of signs and symptoms as outlined, requirement for current medical therapies and most importantly because of direct risk to patient if care not provided in a hospital setting. Expected length of stay > 48 hours.     Bari Cortez DO  12/10/2020  11:31 PM    Copy sent to Dr. Alexa preciado care provider on file.

## 2020-12-12 LAB
ALBUMIN SERPL-MCNC: 3.2 G/DL (ref 3.5–5.2)
ALBUMIN/GLOBULIN RATIO: 1 (ref 1–2.5)
ALP BLD-CCNC: 58 U/L (ref 40–129)
ALT SERPL-CCNC: 48 U/L (ref 5–41)
AST SERPL-CCNC: 39 U/L
BILIRUB SERPL-MCNC: 0.33 MG/DL (ref 0.3–1.2)
BILIRUBIN DIRECT: 0.12 MG/DL
BILIRUBIN, INDIRECT: 0.21 MG/DL (ref 0–1)
BLD PROD TYP BPU: NORMAL
BLD PROD TYP BPU: NORMAL
DISPENSE STATUS BLOOD BANK: NORMAL
DISPENSE STATUS BLOOD BANK: NORMAL
GLOBULIN: ABNORMAL G/DL (ref 1.5–3.8)
TOTAL PROTEIN: 6.3 G/DL (ref 6.4–8.3)
TRANSFUSION STATUS: NORMAL
TRANSFUSION STATUS: NORMAL
UNIT DIVISION: 0
UNIT DIVISION: 0
UNIT NUMBER: NORMAL
UNIT NUMBER: NORMAL

## 2020-12-12 PROCEDURE — 2500000003 HC RX 250 WO HCPCS: Performed by: INTERNAL MEDICINE

## 2020-12-12 PROCEDURE — 6360000002 HC RX W HCPCS: Performed by: CLINICAL NURSE SPECIALIST

## 2020-12-12 PROCEDURE — 2700000000 HC OXYGEN THERAPY PER DAY

## 2020-12-12 PROCEDURE — 36415 COLL VENOUS BLD VENIPUNCTURE: CPT

## 2020-12-12 PROCEDURE — 94761 N-INVAS EAR/PLS OXIMETRY MLT: CPT

## 2020-12-12 PROCEDURE — 2580000003 HC RX 258: Performed by: CLINICAL NURSE SPECIALIST

## 2020-12-12 PROCEDURE — 1200000000 HC SEMI PRIVATE

## 2020-12-12 PROCEDURE — 99232 SBSQ HOSP IP/OBS MODERATE 35: CPT | Performed by: INTERNAL MEDICINE

## 2020-12-12 PROCEDURE — 2580000003 HC RX 258: Performed by: INTERNAL MEDICINE

## 2020-12-12 PROCEDURE — 80076 HEPATIC FUNCTION PANEL: CPT

## 2020-12-12 PROCEDURE — 6370000000 HC RX 637 (ALT 250 FOR IP): Performed by: CLINICAL NURSE SPECIALIST

## 2020-12-12 PROCEDURE — 99232 SBSQ HOSP IP/OBS MODERATE 35: CPT | Performed by: PHYSICIAN ASSISTANT

## 2020-12-12 RX ADMIN — REMDESIVIR 100 MG: 100 INJECTION, POWDER, LYOPHILIZED, FOR SOLUTION INTRAVENOUS at 20:48

## 2020-12-12 RX ADMIN — LEVOTHYROXINE SODIUM 100 MCG: 100 TABLET ORAL at 05:52

## 2020-12-12 RX ADMIN — ENOXAPARIN SODIUM 30 MG: 100 INJECTION SUBCUTANEOUS at 20:48

## 2020-12-12 RX ADMIN — DEXAMETHASONE SODIUM PHOSPHATE 6 MG: 10 INJECTION INTRAMUSCULAR; INTRAVENOUS at 07:59

## 2020-12-12 RX ADMIN — ENOXAPARIN SODIUM 30 MG: 100 INJECTION SUBCUTANEOUS at 07:59

## 2020-12-12 RX ADMIN — SODIUM CHLORIDE, PRESERVATIVE FREE 10 ML: 5 INJECTION INTRAVENOUS at 08:00

## 2020-12-12 RX ADMIN — SODIUM CHLORIDE, PRESERVATIVE FREE 10 ML: 5 INJECTION INTRAVENOUS at 20:49

## 2020-12-12 NOTE — PROGRESS NOTES
being admitted for further management. After initial treatment with high flow oxygen he has improved in the wean down to nasal cannula oxygen. He feels less short of breath at this point. He denies any other acute complaints. No treatment was tried prior to arrival.    Treated with decadron, remdesivir, plasma. Review of Systems:     Constitutional:  negative for chills, fevers, sweats  Respiratory:  negative for cough, dyspnea on exertion, shortness of breath, wheezing  Cardiovascular:  negative for chest pain, chest pressure/discomfort, lower extremity edema, palpitations  Gastrointestinal:  negative for abdominal pain, constipation, diarrhea, nausea, vomiting  Neurological:  negative for dizziness, headache    Medications: Allergies:  No Known Allergies    Current Meds:   Scheduled Meds:    dexamethasone  6 mg Intravenous Daily    levothyroxine  100 mcg Oral Daily    sodium chloride flush  10 mL Intravenous 2 times per day    enoxaparin  30 mg Subcutaneous BID    remdesivir IVPB  100 mg Intravenous Q24H     Continuous Infusions:   PRN Meds: dextromethorphan-guaiFENesin, acetaminophen **OR** acetaminophen, sodium chloride flush, potassium chloride **OR** potassium alternative oral replacement **OR** potassium chloride, magnesium sulfate, acetaminophen **OR** acetaminophen, polyethylene glycol, promethazine **OR** ondansetron, nicotine, sodium chloride, sodium chloride    Data:     Past Medical History:   has a past medical history of Asthma and Hypothyroidism. Social History:   has an unknown smoking status. He has never used smokeless tobacco. He reports previous alcohol use.      Family History:   Family History   Problem Relation Age of Onset    No Known Problems Mother     No Known Problems Father        Vitals:  /85   Pulse 77   Temp 98.1 °F (36.7 °C) (Oral)   Resp 22   Ht 5' 10\" (1.778 m)   Wt 182 lb 9.6 oz (82.8 kg)   SpO2 93%   BMI 26.20 kg/m²   Temp (24hrs), Av.9 °F (36.6 °C), Min:97.3 °F (36.3 °C), Max:98.1 °F (36.7 °C)    No results for input(s): POCGLU in the last 72 hours. I/O (24Hr):     Intake/Output Summary (Last 24 hours) at 12/12/2020 1038  Last data filed at 12/11/2020 2027  Gross per 24 hour   Intake 610 ml   Output 1300 ml   Net -690 ml       Labs:  Hematology:  Recent Labs     12/10/20  1038 12/11/20  0428   WBC 7.0 5.6   RBC 4.52 4.43   HGB 13.9 13.5   HCT 40.3* 41.5   MCV 89.2 93.7   MCH 30.8 30.5   MCHC 34.5 32.5   RDW 12.4 12.3    286   MPV 8.6 8.8   .0*  --    INR 1.0 1.0   DDIMER 1.15  --      Chemistry:  Recent Labs     12/10/20  1038 12/10/20  1206 12/11/20  0428   *  --  134*   K 4.1  --  4.7   CL 98  --  101   CO2 22  --  21   GLUCOSE 108*  --  129*   BUN 11  --  14   CREATININE 0.83  --  0.87   ANIONGAP 13  --  12   LABGLOM >60  --  >60   GFRAA >60  --  >60   CALCIUM 8.7  --  8.8   PROBNP 176  --   --    TROPHS 8 8  --    LACTACIDWB 1.5  --   --      Recent Labs     12/10/20  1038 12/11/20  0428 12/12/20  0556   PROT 6.6 6.7 6.3*   LABALBU 3.3* 3.2* 3.2*   TSH  --  1.18  --    AST 67* 57* 39   ALT 57* 54* 48*   *  --   --    ALKPHOS 52 53 62   BILITOT 0.40 0.33 0.33   BILIDIR  --   --  0.12     ABG:No results found for: POCPH, PHART, PH, POCPCO2, UQU0FNY, PCO2, POCPO2, PO2ART, PO2, POCHCO3, EHY0DIC, HCO3, NBEA, PBEA, BEART, BE, THGBART, THB, UNN0UXQ, LVAF6XIL, E3YKHAJQ, O2SAT, FIO2  No results found for: SPECIAL  No results found for: CULTURE    Radiology:  Xr Chest Portable    Result Date: 12/10/2020  Diffuse bilateral ill-defined linear pulmonary opacities could represent pulmonary edema or atypical pneumonia       Physical Examination:        General appearance:  alert, cooperative and no distress  Mental Status:  oriented to person, place and time and normal affect  Lungs:  clear to auscultation bilaterally, normal effort  Heart:  regular rate and rhythm, no murmur  Abdomen:  soft, nontender, nondistended, normal bowel sounds, no masses, hepatomegaly, splenomegaly  Extremities:  no edema, redness, tenderness in the calves  Skin:  no gross lesions, rashes, induration    Assessment:        Hospital Problems           Last Modified POA    * (Principal) Pneumonia due to COVID-19 virus 12/10/2020 Yes    Hypothyroidism 12/10/2020 Yes    Asthma 12/10/2020 Yes    Acute hypoxemic respiratory failure ([address] due to COVID-19 pneumonia 12/10/2020 Yes          Plan:        Acute hypoxic respiratory failure secondary to covid-19 pneumonia  - Continue Decadaron, Remdesivir   - ID on board  - On high flow, wean as tolerated     Hypothyroidism  - Continue synthroid      Cough  - Robitussin prm     DVT prophylaxis     Myla Murillo PA-C  12/12/2020  10:38 AM

## 2020-12-12 NOTE — PROGRESS NOTES
[facility]  Progress Note    Monica Lancaster  MRN: [id number]  Date: 2020  LOS: 2     Reason for F/U :   COVID-19 virus infection    Impression :   1. Acute hypoxic respiratory failure secondary to COVID-19 virus infection with associated pneumonia  2. Asthma    Recommendations:   · Continue remdesivir through 2020  · The patient had 2 units of convalescent plasma was ordered  · The patient continues on Decadron therapy  · He remains on high flow O2 by nasal cannula 35 L / 85% FiO2  · Continue supportive care    Infection Control Recommendations:   Droplet plus precautions    Discharge Planning:   Estimated Length of IV antimicrobials: 2020  Patient will need Midline Catheter Insertion/ PICC line Insertion: No  Patient will need: Home IV , [place],  SNF,  LTAC: Undetermined  Patient willneed outpatient wound care: No    Medical Decision making / Summary of Stay:   Monica Lancaster is a 60y.o.-year-old male presented over the last 1 week before admission with flulike symptoms muscle aches fevers cough shortness of breath not feeling good. Patient is from care home, history of asthma  Brought by EMS and his saturation was 80, chest x-ray showed bilateral infiltrates suggestive of Covid. Inflammatory markers elevated mostly the ferritin but his creatinine is normal     Patient states he lost the taste of the food, he has no appetite, has no diarrhea    Current evaluation:2020    /85   Pulse 77   Temp 98.1 °F (36.7 °C) (Oral)   Resp 22   Ht 5' 10\" (1.778 m)   Wt 182 lb 9.6 oz (82.8 kg)   SpO2 93%   BMI 26.20 kg/m²     Temperature Range: Temp: 98.1 °F (36.7 °C) Temp  Av.9 °F (36.6 °C)  Min: 97.3 °F (36.3 °C)  Max: 98.1 °F (36.7 °C)  The patient is in the room and has security guards from the residential outside the door. Due to the fact that he is incarcerated he is not allowed to have a phone and the care was discussed with the nurse.     Review of Systems imaging    Cultures:     Strep Pneumoniae Antigen [[id number]] Collected: 12/11/20 1706   Order Status: Completed Updated: 12/11/20 2349    Source . URINE    Strep pneumo Ag NEGATIVE    Comment: Strep pneumoniae antigen not detected      Legionella antigen, urine [[id number]] Collected: 12/11/20 1706   Order Status: Completed Updated: 12/11/20 2348    Legionella Pneumophilia Ag, Urine NEGATIVE    Comment: L. pneumophila serogroup 1 antigen not detected. A negative result does not exclude infection with Leginella pnemophila serogroup 1 nor does   it rule out other microbial-caused respiratory infections of disease caused by other   serogroups of Legionella pneumophila. Medications:      dexamethasone  6 mg Intravenous Daily    levothyroxine  100 mcg Oral Daily    sodium chloride flush  10 mL Intravenous 2 times per day    enoxaparin  30 mg Subcutaneous BID    remdesivir IVPB  100 mg Intravenous Q24H           [facility]  [address] [facility] [address]  OFFICE: [phone]      Electronically signed by Viv Baez MD on 12/12/2020 at 10:54 AM  Thank you for allowing us to participate in the care of this patient. Please call with questions. This note iscreated with the assistance of a speech recognition program.  While intending to generate a document that actually reflects the content of the visit, the document can still have some errors including those of syntax andsound a like substitutions which may escape proof reading. In such instances, actual meaning can be extrapolated by contextual diversion.

## 2020-12-12 NOTE — PLAN OF CARE
Problem: Airway Clearance - Ineffective  Goal: Achieve or maintain patent airway  12/12/2020 0955 by Jazzmine Burgos RCP  Outcome: Ongoing     Problem: Gas Exchange - Impaired  Goal: Absence of hypoxia  12/12/2020 0955 by Jazzmine Burgos RCP  Outcome: Ongoing     Problem: Gas Exchange - Impaired  Goal: Promote optimal lung function  12/12/2020 0955 by Jazzmine Burgos RCP  Outcome: Ongoing     Problem: Breathing Pattern - Ineffective  Goal: Ability to achieve and maintain a regular respiratory rate  12/12/2020 0955 by Jazzmine Burgos RCP  Outcome: Ongoing

## 2020-12-13 LAB
ABSOLUTE EOS #: 0 K/UL (ref 0–0.44)
ABSOLUTE IMMATURE GRANULOCYTE: 0.61 K/UL (ref 0–0.3)
ABSOLUTE LYMPH #: 1.06 K/UL (ref 1.1–3.7)
ABSOLUTE MONO #: 1.06 K/UL (ref 0.1–1.2)
ALBUMIN SERPL-MCNC: 3 G/DL (ref 3.5–5.2)
ALBUMIN/GLOBULIN RATIO: 1 (ref 1–2.5)
ALP BLD-CCNC: 63 U/L (ref 40–129)
ALT SERPL-CCNC: 73 U/L (ref 5–41)
ANION GAP SERPL CALCULATED.3IONS-SCNC: 11 MMOL/L (ref 9–17)
AST SERPL-CCNC: 43 U/L
BASOPHILS # BLD: 0 % (ref 0–2)
BASOPHILS ABSOLUTE: 0 K/UL (ref 0–0.2)
BILIRUB SERPL-MCNC: 0.39 MG/DL (ref 0.3–1.2)
BUN BLDV-MCNC: 22 MG/DL (ref 6–20)
BUN/CREAT BLD: ABNORMAL (ref 9–20)
C-REACTIVE PROTEIN: 15.3 MG/L (ref 0–5)
CALCIUM SERPL-MCNC: 8.4 MG/DL (ref 8.6–10.4)
CHLORIDE BLD-SCNC: 102 MMOL/L (ref 98–107)
CO2: 23 MMOL/L (ref 20–31)
CREAT SERPL-MCNC: 0.88 MG/DL (ref 0.7–1.2)
CULTURE: NORMAL
DIFFERENTIAL TYPE: ABNORMAL
DIRECT EXAM: NORMAL
EOSINOPHILS RELATIVE PERCENT: 0 % (ref 1–4)
GFR AFRICAN AMERICAN: >60 ML/MIN
GFR NON-AFRICAN AMERICAN: >60 ML/MIN
GFR SERPL CREATININE-BSD FRML MDRD: ABNORMAL ML/MIN/{1.73_M2}
GFR SERPL CREATININE-BSD FRML MDRD: ABNORMAL ML/MIN/{1.73_M2}
GLUCOSE BLD-MCNC: 92 MG/DL (ref 70–99)
HCT VFR BLD CALC: 39 % (ref 40.7–50.3)
HEMOGLOBIN: 12.9 G/DL (ref 13–17)
IMMATURE GRANULOCYTES: 4 %
LYMPHOCYTES # BLD: 7 % (ref 24–43)
Lab: NORMAL
MCH RBC QN AUTO: 30.3 PG (ref 25.2–33.5)
MCHC RBC AUTO-ENTMCNC: 33.1 G/DL (ref 28.4–34.8)
MCV RBC AUTO: 91.5 FL (ref 82.6–102.9)
MONOCYTES # BLD: 7 % (ref 3–12)
MORPHOLOGY: NORMAL
NRBC AUTOMATED: 0 PER 100 WBC
PDW BLD-RTO: 12.5 % (ref 11.8–14.4)
PLATELET # BLD: 385 K/UL (ref 138–453)
PLATELET ESTIMATE: ABNORMAL
PMV BLD AUTO: 8.1 FL (ref 8.1–13.5)
POTASSIUM SERPL-SCNC: 4.7 MMOL/L (ref 3.7–5.3)
RBC # BLD: 4.26 M/UL (ref 4.21–5.77)
RBC # BLD: ABNORMAL 10*6/UL
SEG NEUTROPHILS: 82 % (ref 36–65)
SEGMENTED NEUTROPHILS ABSOLUTE COUNT: 12.47 K/UL (ref 1.5–8.1)
SODIUM BLD-SCNC: 136 MMOL/L (ref 135–144)
SPECIMEN DESCRIPTION: NORMAL
TOTAL PROTEIN: 6.1 G/DL (ref 6.4–8.3)
WBC # BLD: 15.2 K/UL (ref 3.5–11.3)
WBC # BLD: ABNORMAL 10*3/UL

## 2020-12-13 PROCEDURE — 86140 C-REACTIVE PROTEIN: CPT

## 2020-12-13 PROCEDURE — 87205 SMEAR GRAM STAIN: CPT

## 2020-12-13 PROCEDURE — 80053 COMPREHEN METABOLIC PANEL: CPT

## 2020-12-13 PROCEDURE — 6370000000 HC RX 637 (ALT 250 FOR IP): Performed by: CLINICAL NURSE SPECIALIST

## 2020-12-13 PROCEDURE — 2580000003 HC RX 258: Performed by: CLINICAL NURSE SPECIALIST

## 2020-12-13 PROCEDURE — 6360000002 HC RX W HCPCS: Performed by: CLINICAL NURSE SPECIALIST

## 2020-12-13 PROCEDURE — 94761 N-INVAS EAR/PLS OXIMETRY MLT: CPT

## 2020-12-13 PROCEDURE — 87070 CULTURE OTHR SPECIMN AEROBIC: CPT

## 2020-12-13 PROCEDURE — 2700000000 HC OXYGEN THERAPY PER DAY

## 2020-12-13 PROCEDURE — 85025 COMPLETE CBC W/AUTO DIFF WBC: CPT

## 2020-12-13 PROCEDURE — 1200000000 HC SEMI PRIVATE

## 2020-12-13 PROCEDURE — 2500000003 HC RX 250 WO HCPCS: Performed by: INTERNAL MEDICINE

## 2020-12-13 PROCEDURE — 2580000003 HC RX 258: Performed by: INTERNAL MEDICINE

## 2020-12-13 PROCEDURE — APPSS45 APP SPLIT SHARED TIME 31-45 MINUTES: Performed by: NURSE PRACTITIONER

## 2020-12-13 PROCEDURE — 99232 SBSQ HOSP IP/OBS MODERATE 35: CPT | Performed by: NURSE PRACTITIONER

## 2020-12-13 PROCEDURE — 36415 COLL VENOUS BLD VENIPUNCTURE: CPT

## 2020-12-13 RX ADMIN — DEXAMETHASONE SODIUM PHOSPHATE 6 MG: 10 INJECTION INTRAMUSCULAR; INTRAVENOUS at 08:30

## 2020-12-13 RX ADMIN — SODIUM CHLORIDE, PRESERVATIVE FREE 10 ML: 5 INJECTION INTRAVENOUS at 08:32

## 2020-12-13 RX ADMIN — LEVOTHYROXINE SODIUM 100 MCG: 100 TABLET ORAL at 06:21

## 2020-12-13 RX ADMIN — ENOXAPARIN SODIUM 30 MG: 100 INJECTION SUBCUTANEOUS at 21:00

## 2020-12-13 RX ADMIN — ENOXAPARIN SODIUM 30 MG: 100 INJECTION SUBCUTANEOUS at 08:27

## 2020-12-13 RX ADMIN — REMDESIVIR 100 MG: 100 INJECTION, POWDER, LYOPHILIZED, FOR SOLUTION INTRAVENOUS at 21:00

## 2020-12-13 NOTE — PLAN OF CARE
PROVIDE ADEQUATE OXYGENATION WITH ACCEPTABLE SP02/ABG'S  [x]  IDENTIFY APPROPRIATE OXYGEN THERAPY  [x]   MONITOR SP02/ABG'S AS NEEDED   [x]   PATIENT EDUCATION AS NEEDED

## 2020-12-13 NOTE — PROGRESS NOTES
[facility]  Office: [address], DO, Salo Pritchett, DO, Anthony Avelar, DO, Flaco Aaron [facility], DO, Anum Granados MD, Amelie Emanuel MD, Forest Black MD, Ailyn Zhao MD, Morgan Underwood MD, Regina Valerio MD, Sravani Brown MD, Rene Munroe MD, Joanie Fung MD, Bipin Hayes DO, Benita Bernal MD, Rocky Lemus MD, Media Sensor, DO, Maynor Busch MD,  Chely Null DO, Gloria Virgen MD, Priti Caballero MD, Leticia Sanford, CNP, [facility], CNP, Nuris Wu, CNP, Nanci Foley, CNS, Rafa Coronel, CNP, Maricel Sahu, CNP, Olinda Vogt, CNP, Merlinda Goring, CNP, Quinten Gutierrez, CNP, Michelle Cooper PA-C, Anh Carmichael, [facility], Tessie Mendez, CNP, Guerda Washington, CNP, Jesus Murphy, CNP, Melissa Franco, CNP, Natacha Rodriguez, CNP         [facility]   [address] [facility]    Progress Note    12/13/2020    11:39 AM    Name:   Lucy Gu  MRN:     [id number]     Acct:      [de-identified]   Room:   2009/2009-01   Day:  3  Admit Date:  12/10/2020 10:16 AM    PCP:   No primary care provider on file. Code Status:  Full Code    Subjective:     C/C:   Chief Complaint   Patient presents with    Shortness of Breath     Interval History Status: improved. Patient continues to improve. Patient remains on heated high flow oxygen via nasal cannula. Respiratory therapy will be working with the patient today in an effort to wean down his oxygen requirement. Patient reports that he feels much better. Patient reports only intermittent cough and exertional dyspnea. Patient remains on remdesivir, convalescent plasma, and Decadron. Infectious disease continues to follow. Condition is progressing well and we are hopeful he will be able to be weaned from high flow O2 over the next 24 hours. Brief History:     Per EMR:       This is a 78-year-old white male who is currently incarcerated in the local [facility] system.   He presents with flulike symptoms with myalgias, fevers and chills, cough and shortness of breath and was concern for COVID-19 due to recent outbreak in the [facility] system. He is found to be markedly hypoxic and required nonrebreather mask initially for stabilization in the emergency department. He was tested for Covid and has tested positive and is being admitted for further management. After initial treatment with high flow oxygen he has improved in the wean down to nasal cannula oxygen. He feels less short of breath at this point. He denies any other acute complaints. No treatment was tried prior to arrival.    Treated with decadron, remdesivir, plasma. 12/13 -Condition is improving each day and he requires less oxygen today. Review of Systems:     Constitutional:  negative for chills, fevers, sweats  Respiratory:  negative for cough, dyspnea on exertion, shortness of breath, wheezing  Cardiovascular:  negative for chest pain, chest pressure/discomfort, lower extremity edema, palpitations  Gastrointestinal:  negative for abdominal pain, constipation, diarrhea, nausea, vomiting  Neurological:  negative for dizziness, headache    Medications: Allergies:  No Known Allergies    Current Meds:   Scheduled Meds:    dexamethasone  6 mg Intravenous Daily    levothyroxine  100 mcg Oral Daily    sodium chloride flush  10 mL Intravenous 2 times per day    enoxaparin  30 mg Subcutaneous BID    remdesivir IVPB  100 mg Intravenous Q24H     Continuous Infusions:   PRN Meds: dextromethorphan-guaiFENesin, acetaminophen **OR** acetaminophen, sodium chloride flush, potassium chloride **OR** potassium alternative oral replacement **OR** potassium chloride, magnesium sulfate, acetaminophen **OR** acetaminophen, polyethylene glycol, promethazine **OR** ondansetron, nicotine, sodium chloride, sodium chloride    Data:     Past Medical History:   has a past medical history of Asthma and Hypothyroidism.     Social History:   has an unknown smoking status. He has never used smokeless tobacco. He reports previous alcohol use. Family History:   Family History   Problem Relation Age of Onset    No Known Problems Mother     No Known Problems Father        Vitals:  BP (!) 152/88   Pulse 74   Temp 98.1 °F (36.7 °C) (Oral)   Resp 18   Ht 5' 10\" (1.778 m)   Wt 182 lb 9.6 oz (82.8 kg)   SpO2 97%   BMI 26.20 kg/m²   Temp (24hrs), Av.8 °F (36.6 °C), Min:97.5 °F (36.4 °C), Max:98.1 °F (36.7 °C)    No results for input(s): POCGLU in the last 72 hours. I/O (24Hr):     Intake/Output Summary (Last 24 hours) at 2020 1139  Last data filed at 2020 1045  Gross per 24 hour   Intake 460 ml   Output 2050 ml   Net -1590 ml       Labs:  Hematology:  Recent Labs     20  0826   WBC 5.6 15.2*   RBC 4.43 4.26   HGB 13.5 12.9*   HCT 41.5 39.0*   MCV 93.7 91.5   MCH 30.5 30.3   MCHC 32.5 33.1   RDW 12.3 12.5    385   MPV 8.8 8.1   INR 1.0  --      Chemistry:  Recent Labs     12/10/20  1206 20  0428 20  0826   NA  --  134* 136   K  --  4.7 4.7   CL  --  101 102   CO2  --  21 23   GLUCOSE  --  129* 92   BUN  --  14 22*   CREATININE  --  0.87 0.88   ANIONGAP  --   11   LABGLOM  --  >60 >60   GFRAA  --  >60 >60   CALCIUM  --  8.8 8.4*   TROPHS 8  --   --      Recent Labs     20  0428 20  0556 20  0826   PROT 6.7 6.3* 6.1*   LABALBU 3.2* 3.2* 3.0*   TSH 1.18  --   --    AST 57* 39 43*   ALT 54* 48* 73*   ALKPHOS 53 58 61   BILITOT 0.33 0.33 0.39   BILIDIR  --  0.12  --      ABG:No results found for: POCPH, PHART, PH, POCPCO2, DUL9NJD, PCO2, POCPO2, PO2ART, PO2, POCHCO3, SII3ACQ, HCO3, NBEA, PBEA, BEART, BE, THGBART, THB, FTA7GSF, UNBF2GGC, Q7EVFRTE, O2SAT, FIO2  No results found for: SPECIAL  No results found for: CULTURE    Radiology:  Xr Chest Portable    Result Date: 12/10/2020  Diffuse bilateral ill-defined linear pulmonary opacities could represent pulmonary edema or atypical pneumonia Physical Examination:        General appearance:  alert, cooperative and no distress  Mental Status:  oriented to person, place and time and normal affect  Lungs:  clear to auscultation bilaterally, normal effort  Heart:  regular rate and rhythm, no murmur  Abdomen:  soft, nontender, nondistended, normal bowel sounds, no masses, hepatomegaly, splenomegaly  Extremities:  no edema, redness, tenderness in the calves  Skin:  no gross lesions, rashes, induration    Assessment:        Hospital Problems           Last Modified POA    * (Principal) Pneumonia due to COVID-19 virus 12/10/2020 Yes    Hypothyroidism 12/10/2020 Yes    Asthma 12/10/2020 Yes    Acute hypoxemic respiratory failure ([address] due to COVID-19 pneumonia 12/10/2020 Yes          Plan:        Acute hypoxic respiratory failure secondary to covid-19 pneumonia  - Continue Decadaron, Remdesivir   - ID on board  - On high flow, wean as tolerated     Hypothyroidism  - Continue synthroid      Cough  - Robitussin prm         JOEY Torrez NP  12/13/2020  11:39 AM

## 2020-12-13 NOTE — PLAN OF CARE
Problem: Airway Clearance - Ineffective  Goal: Achieve or maintain patent airway  12/13/2020 0825 by Prakash Valencia RCP  Outcome: Ongoing  12/12/2020 2333 by Shraddha Engle RN  Outcome: Ongoing     Problem: Gas Exchange - Impaired  Goal: Absence of hypoxia  12/13/2020 0825 by Prakash Valencia RCP  Outcome: Ongoing  12/12/2020 2333 by Shraddha Engle RN  Outcome: Ongoing  Goal: Promote optimal lung function  12/12/2020 2333 by Shraddha Engle RN  Outcome: Ongoing     Problem: Breathing Pattern - Ineffective  Goal: Ability to achieve and maintain a regular respiratory rate  12/13/2020 0825 by Prakash Valencia RCP  Outcome: Ongoing  12/12/2020 2333 by Shraddha Engle RN  Outcome: Ongoing

## 2020-12-13 NOTE — PLAN OF CARE
Problem: Airway Clearance - Ineffective  Goal: Achieve or maintain patent airway  12/12/2020 2333 by Magalis Lemon RN  Outcome: Ongoing  12/12/2020 0955 by Neto Cruz RCP  Outcome: Ongoing     Problem: Gas Exchange - Impaired  Goal: Absence of hypoxia  12/12/2020 2333 by Magalis Lemon RN  Outcome: Ongoing  12/12/2020 0955 by Neto Cruz RCP  Outcome: Ongoing  Goal: Promote optimal lung function  12/12/2020 2333 by Magalis Lemon RN  Outcome: Ongoing  12/12/2020 0955 by Neto Cruz RCP  Outcome: Ongoing     Problem: Breathing Pattern - Ineffective  Goal: Ability to achieve and maintain a regular respiratory rate  12/12/2020 2333 by Magalis Lemon RN  Outcome: Ongoing  12/12/2020 0955 by Neto Cruz RCP  Outcome: Ongoing     Problem:  Body Temperature -  Risk of, Imbalanced  Goal: Ability to maintain a body temperature within defined limits  Outcome: Ongoing  Goal: Will regain or maintain usual level of consciousness  Outcome: Ongoing  Goal: Complications related to the disease process, condition or treatment will be avoided or minimized  Outcome: Ongoing     Problem: Isolation Precautions - Risk of Spread of Infection  Goal: Prevent transmission of infection  Outcome: Ongoing     Problem: Nutrition Deficits  Goal: Optimize nutrtional status  Outcome: Ongoing     Problem: Risk for Fluid Volume Deficit  Goal: Maintain normal heart rhythm  Outcome: Ongoing  Goal: Maintain absence of muscle cramping  Outcome: Ongoing  Goal: Maintain normal serum potassium, sodium, calcium, phosphorus, and pH  Outcome: Ongoing     Problem: Loneliness or Risk for Loneliness  Goal: Demonstrate positive use of time alone when socialization is not possible  Outcome: Ongoing     Problem: Fatigue  Goal: Verbalize increase energy and improved vitality  Outcome: Ongoing     Problem: Patient Education: Go to Patient Education Activity  Goal: Patient/Family Education  Outcome: Ongoing

## 2020-12-14 LAB
CULTURE: ABNORMAL
DIRECT EXAM: ABNORMAL
Lab: ABNORMAL
SPECIMEN DESCRIPTION: ABNORMAL

## 2020-12-14 PROCEDURE — 6370000000 HC RX 637 (ALT 250 FOR IP): Performed by: CLINICAL NURSE SPECIALIST

## 2020-12-14 PROCEDURE — 94761 N-INVAS EAR/PLS OXIMETRY MLT: CPT

## 2020-12-14 PROCEDURE — 2700000000 HC OXYGEN THERAPY PER DAY

## 2020-12-14 PROCEDURE — 6360000002 HC RX W HCPCS: Performed by: CLINICAL NURSE SPECIALIST

## 2020-12-14 PROCEDURE — 2580000003 HC RX 258: Performed by: INTERNAL MEDICINE

## 2020-12-14 PROCEDURE — 99232 SBSQ HOSP IP/OBS MODERATE 35: CPT | Performed by: INTERNAL MEDICINE

## 2020-12-14 PROCEDURE — 2580000003 HC RX 258: Performed by: CLINICAL NURSE SPECIALIST

## 2020-12-14 PROCEDURE — 1200000000 HC SEMI PRIVATE

## 2020-12-14 PROCEDURE — 99232 SBSQ HOSP IP/OBS MODERATE 35: CPT | Performed by: PHYSICIAN ASSISTANT

## 2020-12-14 PROCEDURE — 2500000003 HC RX 250 WO HCPCS: Performed by: INTERNAL MEDICINE

## 2020-12-14 RX ADMIN — REMDESIVIR 100 MG: 100 INJECTION, POWDER, LYOPHILIZED, FOR SOLUTION INTRAVENOUS at 21:55

## 2020-12-14 RX ADMIN — SODIUM CHLORIDE, PRESERVATIVE FREE 10 ML: 5 INJECTION INTRAVENOUS at 08:52

## 2020-12-14 RX ADMIN — ENOXAPARIN SODIUM 30 MG: 100 INJECTION SUBCUTANEOUS at 08:52

## 2020-12-14 RX ADMIN — LEVOTHYROXINE SODIUM 100 MCG: 100 TABLET ORAL at 08:51

## 2020-12-14 RX ADMIN — ENOXAPARIN SODIUM 30 MG: 100 INJECTION SUBCUTANEOUS at 21:55

## 2020-12-14 RX ADMIN — SODIUM CHLORIDE, PRESERVATIVE FREE 10 ML: 5 INJECTION INTRAVENOUS at 21:59

## 2020-12-14 RX ADMIN — DEXAMETHASONE SODIUM PHOSPHATE 6 MG: 10 INJECTION INTRAMUSCULAR; INTRAVENOUS at 08:51

## 2020-12-14 NOTE — CARE COORDINATION
TRANSITIONAL CARE PLANNING/ 2 [facility] Day: 4    Reason for Admission: COVID-19 virus infection [U07.1]     Treatment Plan of Care: O2 @4L per nc, last dose of Remdesivir today,Decadron. Readmission Risk              Risk of Unplanned Readmission:        15            Patient goals/Treatment Preferences/Transitional Plan:   Return to [facility] facility per custodial guards.

## 2020-12-14 NOTE — PROGRESS NOTES
management. After initial treatment with high flow oxygen he has improved in the wean down to nasal cannula oxygen. He feels less short of breath at this point. He denies any other acute complaints. No treatment was tried prior to arrival.    Treated with decadron, remdesivir, plasma. Review of Systems:     Constitutional:  negative for chills, fevers, sweats  Respiratory:  negative for cough, dyspnea on exertion, shortness of breath, wheezing  Cardiovascular:  negative for chest pain, chest pressure/discomfort, lower extremity edema, palpitations  Gastrointestinal:  negative for abdominal pain, constipation, diarrhea, nausea, vomiting  Neurological:  negative for dizziness, headache    Medications: Allergies:  No Known Allergies    Current Meds:   Scheduled Meds:    dexamethasone  6 mg Intravenous Daily    levothyroxine  100 mcg Oral Daily    sodium chloride flush  10 mL Intravenous 2 times per day    enoxaparin  30 mg Subcutaneous BID    remdesivir IVPB  100 mg Intravenous Q24H     Continuous Infusions:   PRN Meds: dextromethorphan-guaiFENesin, acetaminophen **OR** acetaminophen, sodium chloride flush, potassium chloride **OR** potassium alternative oral replacement **OR** potassium chloride, magnesium sulfate, acetaminophen **OR** acetaminophen, polyethylene glycol, promethazine **OR** ondansetron, nicotine, sodium chloride, sodium chloride    Data:     Past Medical History:   has a past medical history of Asthma and Hypothyroidism. Social History:   has an unknown smoking status. He has never used smokeless tobacco. He reports previous alcohol use.      Family History:   Family History   Problem Relation Age of Onset    No Known Problems Mother     No Known Problems Father        Vitals:  BP (!) 174/116   Pulse 72   Temp 98.2 °F (36.8 °C) (Oral)   Resp 22   Ht 5' 10\" (1.778 m)   Wt 181 lb 6.4 oz (82.3 kg)   SpO2 92%   BMI 26.03 kg/m²   Temp (24hrs), Av.4 °F (36.9 °C), Min:98.2 °F (36.8 °C), Max:98.5 °F (36.9 °C)    No results for input(s): POCGLU in the last 72 hours. I/O (24Hr):     Intake/Output Summary (Last 24 hours) at 12/14/2020 1715  Last data filed at 12/14/2020 0901  Gross per 24 hour   Intake --   Output 400 ml   Net -400 ml       Labs:  Hematology:  Recent Labs     12/13/20  0826   WBC 15.2*   RBC 4.26   HGB 12.9*   HCT 39.0*   MCV 91.5   MCH 30.3   MCHC 33.1   RDW 12.5      MPV 8.1   CRP 15.3*     Chemistry:  Recent Labs     12/13/20  0826      K 4.7      CO2 23   GLUCOSE 92   BUN 22*   CREATININE 0.88   ANIONGAP 11   LABGLOM >60   GFRAA >60   CALCIUM 8.4*     Recent Labs     12/12/20  0556 12/13/20  0826   PROT 6.3* 6.1*   LABALBU 3.2* 3.0*   AST 39 43*   ALT 48* 73*   ALKPHOS 58 63   BILITOT 0.33 0.39   BILIDIR 0.12  --      ABG:No results found for: POCPH, PHART, PH, POCPCO2, KEN0MYK, PCO2, POCPO2, PO2ART, PO2, POCHCO3, PCR2VXT, HCO3, NBEA, PBEA, BEART, BE, THGBART, THB, GDV8MED, TJOH5ZKK, T1JVOBJA, O2SAT, FIO2  Lab Results   Component Value Date/Time    SPECIAL NOT REPORTED 12/13/2020 09:02 PM     Lab Results   Component Value Date/Time    CULTURE NORMAL RESPIRATORY MELITA MODERATE GROWTH 12/13/2020 09:02 PM       Radiology:  Xr Chest Portable    Result Date: 12/10/2020  Diffuse bilateral ill-defined linear pulmonary opacities could represent pulmonary edema or atypical pneumonia       Physical Examination:        General appearance:  alert, cooperative and no distress  Mental Status:  oriented to person, place and time and normal affect  Lungs:  clear to auscultation bilaterally, normal effort  Heart:  regular rate and rhythm, no murmur  Abdomen:  soft, nontender, nondistended, normal bowel sounds, no masses, hepatomegaly, splenomegaly  Extremities:  no edema, redness, tenderness in the calves  Skin:  no gross lesions, rashes, induration    Assessment:        Hospital Problems           Last Modified POA    * (Principal) Pneumonia due to COVID-19 virus 12/10/2020 Yes    Hypothyroidism 12/10/2020 Yes    Asthma 12/10/2020 Yes    Acute hypoxemic respiratory failure ([facility] [address] due to COVID-19 pneumonia 12/10/2020 Yes          Plan:        Acute hypoxic respiratory failure secondary to covid-19 pneumonia  - Continue Decadaron, Remdesivir   - ID on board  - on nasal cannula 4L, saturating well     Hypothyroidism  - Continue synthroid      Cough  - Robitussin prm     DVT prophylaxis     Plan for discharge tomorrow      Ortiz Mattson PA-C  12/14/2020  5:15 PM

## 2020-12-15 LAB
SARS-COV-2, RAPID: DETECTED
SARS-COV-2: ABNORMAL
SARS-COV-2: ABNORMAL
SOURCE: ABNORMAL

## 2020-12-15 PROCEDURE — 6360000002 HC RX W HCPCS: Performed by: CLINICAL NURSE SPECIALIST

## 2020-12-15 PROCEDURE — 99232 SBSQ HOSP IP/OBS MODERATE 35: CPT | Performed by: INTERNAL MEDICINE

## 2020-12-15 PROCEDURE — 6370000000 HC RX 637 (ALT 250 FOR IP): Performed by: CLINICAL NURSE SPECIALIST

## 2020-12-15 PROCEDURE — 1200000000 HC SEMI PRIVATE

## 2020-12-15 PROCEDURE — 2580000003 HC RX 258: Performed by: CLINICAL NURSE SPECIALIST

## 2020-12-15 PROCEDURE — APPSS30 APP SPLIT SHARED TIME 16-30 MINUTES: Performed by: PHYSICIAN ASSISTANT

## 2020-12-15 RX ADMIN — ENOXAPARIN SODIUM 30 MG: 100 INJECTION SUBCUTANEOUS at 12:16

## 2020-12-15 RX ADMIN — SODIUM CHLORIDE, PRESERVATIVE FREE 10 ML: 5 INJECTION INTRAVENOUS at 20:36

## 2020-12-15 RX ADMIN — LEVOTHYROXINE SODIUM 100 MCG: 100 TABLET ORAL at 12:16

## 2020-12-15 RX ADMIN — DEXAMETHASONE SODIUM PHOSPHATE 6 MG: 10 INJECTION INTRAMUSCULAR; INTRAVENOUS at 12:16

## 2020-12-15 RX ADMIN — ENOXAPARIN SODIUM 30 MG: 100 INJECTION SUBCUTANEOUS at 20:35

## 2020-12-15 ASSESSMENT — ENCOUNTER SYMPTOMS
EYE ITCHING: 0
COLOR CHANGE: 0
EYE DISCHARGE: 0
APNEA: 0
COUGH: 0
SHORTNESS OF BREATH: 0
ABDOMINAL DISTENTION: 0

## 2020-12-15 NOTE — PROGRESS NOTES
[facility] -   Infectious diseases evaluation  admission date 12/10/2020    reason for consultation:   \Covid related respiratory infection    Impression :   Current:  · Covid, pneumonia  · Elevated ferritin  · Change patient  Discussion / summary of stay / plan of care   ·   Recommendations   · post remdesivir 12/10 until 12/14  · post 2 units of plasma 12/10, consented and ordered 12/10  · Ok for DC w home o2 eval    Infection Control Recommendations   · [place] Precautions  · Contact Isolation   · Airborne isolation  · Droplet Isolation      Antimicrobial Stewardship Recommendations   · Simplification of therapy  · Targeted therapy      Coordination ofOutpatient Care:   · Estimated Length of IV antimicrobials:  · Patient will need Midline / picc Catheter Insertion:   · Patient will need SNF:  · Patient will need outpatient wound care:     History of Present Illness:   Initial history:  Mariluz Stanley is a 60y.o.-year-old male presented over the last 1 week before admission with flulike symptoms muscle aches fevers cough shortness of breath not feeling good. Patient is from group home, history of asthma  Brought by EMS and his saturation was 80, chest x-ray showed bilateral infiltrates suggestive of Covid.   Inflammatory markers elevated mostly the ferritin but his creatinine is normal    Patient states he lost the taste of the food, he has no appetite, has no diarrhea    Interval changes   Patient Vitals for the past 8 hrs:   BP Temp Temp src Pulse SpO2   12/14/20 2158 (!) 139/93 97.7 °F (36.5 °C) Oral 81 92 %     Complete turnaround he is on 5 L feeling great not short of breath, saturating on and off between 91 and 89   No abdominal pain or diarrhea, would like to go home  Labs reviewed      Summary of relevant labs:  Labs:  ALT 57  AST 67  Ferritin 1,949    Procalcitonin 0.10    -15    WBC 7  Micro:  Covid +12/10  Imaging:  Chest x-ray 12/10  Diffuse bilateral ill-defined linear pulmonary opacities could represent   pulmonary edema or atypical pneumonia       I have personally reviewed the past medical history, past surgical history, medications, social history, and family history, and I haveupdated the database accordingly. Allergies:   Patient has no known allergies. Review of Systems:     Review of Systems   Constitutional: Positive for fatigue. Negative for activity change, appetite change and fever. HENT: Negative for congestion. Eyes: Negative for discharge and itching. Respiratory: Negative for apnea, cough and shortness of breath. Cardiovascular: Negative for chest pain. Gastrointestinal: Negative for abdominal distention. Endocrine: Negative for heat intolerance. Genitourinary: Negative for dysuria and flank pain. Musculoskeletal: Negative for arthralgias. Skin: Negative for color change. Allergic/Immunologic: Negative for immunocompromised state. Neurological: Negative for dizziness. Hematological: Negative for adenopathy. Psychiatric/Behavioral: Negative for agitation. Physical Examination :       Physical Exam  Constitutional:       General: He is not in acute distress. Appearance: Normal appearance. He is not ill-appearing. HENT:      Head: Normocephalic and atraumatic. Nose: Nose normal.      Mouth/Throat:      Mouth: Mucous membranes are moist.   Eyes:      General: No scleral icterus. Conjunctiva/sclera: Conjunctivae normal.      Pupils: Pupils are equal, round, and reactive to light. Neck:      Musculoskeletal: Neck supple. No neck rigidity or muscular tenderness. Cardiovascular:      Rate and Rhythm: Normal rate and regular rhythm. Heart sounds: Normal heart sounds. No murmur. Pulmonary:      Effort: No respiratory distress. Breath sounds: Normal breath sounds. Abdominal:      General: There is no distension. Palpations: Abdomen is soft.    Genitourinary: Comments: No Mccall  Musculoskeletal:         General: No swelling or deformity. Skin:     General: Skin is warm. Coloration: Skin is not jaundiced. Neurological:      General: No focal deficit present. Mental Status: He is alert. Mental status is at baseline. Psychiatric:         Mood and Affect: Mood normal.         Thought Content:  Thought content normal.         Past Medical History:     Past Medical History:   Diagnosis Date    Asthma     Hypothyroidism        Past Surgical  History:     Past Surgical History:   Procedure Laterality Date    NOSE SURGERY      Nasal polyps       Medications:      dexamethasone  6 mg Intravenous Daily    levothyroxine  100 mcg Oral Daily    sodium chloride flush  10 mL Intravenous 2 times per day    enoxaparin  30 mg Subcutaneous BID       Social History:     Social History     Socioeconomic History    Marital status: Single     Spouse name: Not on file    Number of children: Not on file    Years of education: Not on file    Highest education level: Not on file   Occupational History    Not on file   Social Needs    Financial resource strain: Not on file    Food insecurity     Worry: Not on file     Inability: Not on file    Transportation needs     Medical: Not on file     Non-medical: Not on file   Tobacco Use    Smoking status: Unknown If Ever Smoked    Smokeless tobacco: Never Used   Substance and Sexual Activity    Alcohol use: Not Currently    Drug use: Not on file    Sexual activity: Not on file   Lifestyle    Physical activity     Days per week: Not on file     Minutes per session: Not on file    Stress: Not on file   Relationships    Social connections     Talks on phone: Not on file     Gets together: Not on file     Attends [employer] service: Not on file     Active member of club or organization: Not on file     Attends meetings of clubs or organizations: Not on file     Relationship status: Not on file    Intimate partner violence Fear of current or ex partner: Not on file     Emotionally abused: Not on file     Physically abused: Not on file     Forced sexual activity: Not on file   Other Topics Concern    Not on file   Social History Narrative    Not on file       Family History:     Family History   Problem Relation Age of Onset    No Known Problems Mother     No Known Problems Father       Medical Decision Making:   I have independently reviewed/ordered the following labs:    CBC with Differential:   Recent Labs     12/13/20  0826   WBC 15.2*   HGB 12.9*   HCT 39.0*      LYMPHOPCT 7*   MONOPCT 7     BMP:  Recent Labs     12/13/20  0826      K 4.7      CO2 23   BUN 22*   CREATININE 0.88     Hepatic Function Panel:   Recent Labs     12/12/20  0556 12/13/20  0826   PROT 6.3* 6.1*   LABALBU 3.2* 3.0*   BILIDIR 0.12  --    IBILI 0.21  --    BILITOT 0.33 0.39   ALKPHOS 58 63   ALT 48* 73*   AST 39 43*     No results for input(s): RPR in the last 72 hours. No results for input(s): HIV in the last 72 hours. No results for input(s): BC in the last 72 hours. Lab Results   Component Value Date    CREATININE 0.88 12/13/2020    GLUCOSE 92 12/13/2020       Detailed results: Thank you for allowing us to participate in the care of this patient. Please call with questions. This note is created with the assistance of a speech recognition program.  While intending to generate adocument that actually reflects the content of the visit, the document can still have some errors including those of syntax and sound a like substitutions which may escape proof reading. It such instances, actual meaningcan be extrapolated by contextual diversion.     Tawanna Renee MD  Office: [phone]  Perfect serve / office [phone]        '

## 2020-12-15 NOTE — PROGRESS NOTES
with high flow oxygen he has improved in the wean down to nasal cannula oxygen. He feels less short of breath at this point. He denies any other acute complaints. No treatment was tried prior to arrival.    Treated with decadron, remdesivir, plasma. Review of Systems:     Constitutional:  negative for chills, fevers, sweats  Respiratory:  negative for cough, dyspnea on exertion, shortness of breath, wheezing  Cardiovascular:  negative for chest pain, chest pressure/discomfort, lower extremity edema, palpitations  Gastrointestinal:  negative for abdominal pain, constipation, diarrhea, nausea, vomiting  Neurological:  negative for dizziness, headache    Medications: Allergies:  No Known Allergies    Current Meds:   Scheduled Meds:    dexamethasone  6 mg Intravenous Daily    levothyroxine  100 mcg Oral Daily    sodium chloride flush  10 mL Intravenous 2 times per day    enoxaparin  30 mg Subcutaneous BID     Continuous Infusions:   PRN Meds: dextromethorphan-guaiFENesin, acetaminophen **OR** acetaminophen, sodium chloride flush, potassium chloride **OR** potassium alternative oral replacement **OR** potassium chloride, magnesium sulfate, acetaminophen **OR** acetaminophen, polyethylene glycol, promethazine **OR** ondansetron, nicotine, sodium chloride, sodium chloride    Data:     Past Medical History:   has a past medical history of Asthma and Hypothyroidism. Social History:   has an unknown smoking status. He has never used smokeless tobacco. He reports previous alcohol use.      Family History:   Family History   Problem Relation Age of Onset    No Known Problems Mother     No Known Problems Father        Vitals:  /83   Pulse 90   Temp 98.1 °F (36.7 °C) (Oral)   Resp 20   Ht 5' 10\" (1.778 m)   Wt 181 lb 6.4 oz (82.3 kg)   SpO2 (!) 89%   BMI 26.03 kg/m²   Temp (24hrs), Av.9 °F (36.6 °C), Min:97.7 °F (36.5 °C), Max:98.1 °F (36.7 °C)    No results for input(s): POCGLU in the last 72 hours.    I/O (24Hr):     Intake/Output Summary (Last 24 hours) at 12/15/2020 0954  Last data filed at 12/15/2020 0846  Gross per 24 hour   Intake --   Output 2085 ml   Net -2085 ml       Labs:  Hematology:  Recent Labs     12/13/20  0826   WBC 15.2*   RBC 4.26   HGB 12.9*   HCT 39.0*   MCV 91.5   MCH 30.3   MCHC 33.1   RDW 12.5      MPV 8.1   CRP 15.3*     Chemistry:  Recent Labs     12/13/20  0826      K 4.7      CO2 23   GLUCOSE 92   BUN 22*   CREATININE 0.88   ANIONGAP 11   LABGLOM >60   GFRAA >60   CALCIUM 8.4*     Recent Labs     12/13/20 0826   PROT 6.1*   LABALBU 3.0*   AST 43*   ALT 73*   ALKPHOS 63   BILITOT 0.39     ABG:No results found for: POCPH, PHART, PH, POCPCO2, EWQ2XXZ, PCO2, POCPO2, PO2ART, PO2, POCHCO3, IGZ9GIX, HCO3, NBEA, PBEA, BEART, BE, THGBART, THB, POB8JOP, JSBE3QMC, C1ZGDJTI, O2SAT, FIO2  Lab Results   Component Value Date/Time    SPECIAL NOT REPORTED 12/13/2020 09:02 PM     Lab Results   Component Value Date/Time    CULTURE NORMAL RESPIRATORY MELITA MODERATE GROWTH 12/13/2020 09:02 PM       Radiology:  Xr Chest Portable    Result Date: 12/10/2020  Diffuse bilateral ill-defined linear pulmonary opacities could represent pulmonary edema or atypical pneumonia       Physical Examination:        General appearance:  alert, cooperative and no distress  Mental Status:  oriented to person, place and time and normal affect  Lungs:  clear to auscultation bilaterally, normal effort  Heart:  regular rate and rhythm, no murmur  Abdomen:  soft, nontender, nondistended, normal bowel sounds, no masses, hepatomegaly, splenomegaly  Extremities:  no edema, redness, tenderness in the calves  Skin:  no gross lesions, rashes, induration    Assessment:        Hospital Problems           Last Modified POA    * (Principal) Pneumonia due to COVID-19 virus 12/10/2020 Yes    Hypothyroidism 12/10/2020 Yes    Asthma 12/10/2020 Yes    Acute hypoxemic respiratory failure ([address] due to COVID-19 pneumonia 12/10/2020 Yes          Plan:        Acute hypoxic respiratory failure secondary to covid-19 pneumonia  - Continue Decadaron, Remdesivir   - ID on board  - on nasal cannula 5L, will take him down to 3 and see how he does     Hypothyroidism  - Continue synthroid      Cough  - Robitussin prm     DVT prophylaxis     Can be discharged once confirmed with correctional facility      Zhanna Villarreal PA-C  12/15/2020  9:54 AM

## 2020-12-15 NOTE — PLAN OF CARE
Problem: Airway Clearance - Ineffective  Goal: Achieve or maintain patent airway  12/15/2020 1425 by Savita Penn RN  Outcome: Ongoing  Note: Requiring 4L oxygen

## 2020-12-16 VITALS
OXYGEN SATURATION: 93 % | TEMPERATURE: 97.7 F | RESPIRATION RATE: 22 BRPM | HEART RATE: 99 BPM | DIASTOLIC BLOOD PRESSURE: 80 MMHG | SYSTOLIC BLOOD PRESSURE: 121 MMHG | HEIGHT: 70 IN | BODY MASS INDEX: 25.97 KG/M2 | WEIGHT: 181.4 LBS

## 2020-12-16 PROCEDURE — 2580000003 HC RX 258: Performed by: CLINICAL NURSE SPECIALIST

## 2020-12-16 PROCEDURE — 6360000002 HC RX W HCPCS: Performed by: CLINICAL NURSE SPECIALIST

## 2020-12-16 PROCEDURE — 99239 HOSP IP/OBS DSCHRG MGMT >30: CPT | Performed by: PHYSICIAN ASSISTANT

## 2020-12-16 PROCEDURE — 6370000000 HC RX 637 (ALT 250 FOR IP): Performed by: CLINICAL NURSE SPECIALIST

## 2020-12-16 RX ORDER — DEXAMETHASONE 6 MG/1
6 TABLET ORAL
Qty: 3 TABLET | Refills: 0 | Status: SHIPPED | OUTPATIENT
Start: 2020-12-16 | End: 2020-12-19

## 2020-12-16 RX ADMIN — SODIUM CHLORIDE, PRESERVATIVE FREE 10 ML: 5 INJECTION INTRAVENOUS at 08:32

## 2020-12-16 RX ADMIN — LEVOTHYROXINE SODIUM 100 MCG: 100 TABLET ORAL at 07:44

## 2020-12-16 RX ADMIN — ENOXAPARIN SODIUM 30 MG: 100 INJECTION SUBCUTANEOUS at 07:46

## 2020-12-16 RX ADMIN — DEXAMETHASONE SODIUM PHOSPHATE 6 MG: 10 INJECTION INTRAMUSCULAR; INTRAVENOUS at 07:48

## 2020-12-16 ASSESSMENT — ENCOUNTER SYMPTOMS
APNEA: 0
ABDOMINAL DISTENTION: 0
EYE ITCHING: 0
COUGH: 0
COLOR CHANGE: 0
EYE DISCHARGE: 0
SHORTNESS OF BREATH: 0

## 2020-12-16 ASSESSMENT — PAIN SCALES - GENERAL: PAINLEVEL_OUTOF10: 0

## 2020-12-16 NOTE — PROGRESS NOTES
[facility]  Office: [address], , David Reyna, DO, Waldocasye Duarte, DO, Guadalupe Monteiroeffie Mobley, DO, Xiomara Lara MD, Serene Blake MD, Brittany August MD, Bishop Perez MD, Blue Little MD, Volodymyr Salgado MD, Kristine Oliveira MD, Domenic Holcomb MD, Mbonu Melinda Mccallum MD, Cortes Arellano, DO, Jose Santiago MD, Demetrice Mustafa MD, Alek Escobar DO, Jonathan Jolly MD,  Michelle Licona DO, Antoinette Lawrence MD, Melecio Nicholson MD, Neptali [facility], [facility], CNP, Isidro Shearer CNP, Andreina Will, CNS, Julienne Rice, CNP, Michael Arriaza, CNP, Damien Morrow, CNP, Nidhi Thibodeaux, CNP, Star Delgado, CNP, Mary Rolon PA-C, Gay Ruiz, [facility], Cipriano Garcia, CNP, Mat Rowland, CNP, [place], CNP, Sabina Nuñez, CNP, Marge Gupta, [facility]   [address] [facility]    Progress Note    12/16/2020    1:52 PM    Name:   Kendra Mullins  MRN:     [id number]     Acct:      [de-identified]   Room:   2009/2009-01  IP Day:  6  Admit Date:  12/10/2020 10:16 AM    PCP:   No primary care provider on file. Code Status:  Full Code    Subjective:     C/C:   Chief Complaint   Patient presents with    Shortness of Breath     Interval History Status: improved. Pt seen and evaluated. Respiratory status continues to improve daily. Saturating well on 3L. Afebrile and hemodynamically stable. No new complaints. Brief History:     Per EMR:       This is a 78-year-old white male who is currently incarcerated in the local alf system. He presents with flulike symptoms with myalgias, fevers and chills, cough and shortness of breath and was concern for COVID-19 due to recent outbreak in the alf system. He is found to be markedly hypoxic and required nonrebreather mask initially for stabilization in the emergency department. He was tested for Covid and has tested positive and is being admitted for further management.   After initial treatment with high flow oxygen he has improved in the wean down to nasal cannula oxygen. He feels less short of breath at this point. He denies any other acute complaints. No treatment was tried prior to arrival.    Treated with decadron, remdesivir, plasma. Review of Systems:     Constitutional:  negative for chills, fevers, sweats  Respiratory:  negative for cough, dyspnea on exertion, shortness of breath, wheezing  Cardiovascular:  negative for chest pain, chest pressure/discomfort, lower extremity edema, palpitations  Gastrointestinal:  negative for abdominal pain, constipation, diarrhea, nausea, vomiting  Neurological:  negative for dizziness, headache    Medications: Allergies:  No Known Allergies    Current Meds:   Scheduled Meds:    dexamethasone  6 mg Intravenous Daily    levothyroxine  100 mcg Oral Daily    sodium chloride flush  10 mL Intravenous 2 times per day    enoxaparin  30 mg Subcutaneous BID     Continuous Infusions:   PRN Meds: dextromethorphan-guaiFENesin, acetaminophen **OR** acetaminophen, sodium chloride flush, potassium chloride **OR** potassium alternative oral replacement **OR** potassium chloride, magnesium sulfate, acetaminophen **OR** acetaminophen, polyethylene glycol, promethazine **OR** ondansetron, nicotine, sodium chloride, sodium chloride    Data:     Past Medical History:   has a past medical history of Asthma and Hypothyroidism. Social History:   has an unknown smoking status. He has never used smokeless tobacco. He reports previous alcohol use.      Family History:   Family History   Problem Relation Age of Onset    No Known Problems Mother     No Known Problems Father        Vitals:  /88   Pulse 98   Temp 97.7 °F (36.5 °C) (Oral)   Resp 28   Ht 5' 10\" (1.778 m)   Wt 181 lb 6.4 oz (82.3 kg)   SpO2 93%   BMI 26.03 kg/m²   Temp (24hrs), Av °F (36.7 °C), Min:97.7 °F (36.5 °C), Max:98.8 °F (37.1 °C)    No results for input(s): POCGLU in the last 72 hours.    I/O (24Hr): Intake/Output Summary (Last 24 hours) at 12/16/2020 1352  Last data filed at 12/16/2020 0759  Gross per 24 hour   Intake --   Output 2410 ml   Net -2410 ml       Labs:  Hematology:  No results for input(s): WBC, RBC, HGB, HCT, MCV, MCH, MCHC, RDW, PLT, MPV, SEDRATE, CRP, INR, DDIMER, [id number], LABABSO in the last 72 hours. Invalid input(s): PT  Chemistry:  No results for input(s): NA, K, CL, CO2, GLUCOSE, BUN, CREATININE, MG, ANIONGAP, LABGLOM, GFRAA, CALCIUM, CAION, PHOS, PSA, PROBNP, TROPHS, CKTOTAL, CKMB, CKMBINDEX, MYOGLOBIN, DIGOXIN, LACTACIDWB in the last 72 hours. No results for input(s): PROT, LABALBU, LABA1C, Q2JZKHY, V1ZDUEQ, FT4, TSH, AST, ALT, LDH, GGT, ALKPHOS, LABGGT, BILITOT, BILIDIR, AMMONIA, AMYLASE, LIPASE, LACTATE, CHOL, HDL, LDLCHOLESTEROL, CHOLHDLRATIO, TRIG, VLDL, XJX47UY, PHENYTOIN, PHENYF, URICACID, POCGLU in the last 72 hours.   ABG:No results found for: POCPH, PHART, PH, POCPCO2, QLD6FYT, PCO2, POCPO2, PO2ART, PO2, POCHCO3, XBE0XNW, HCO3, NBEA, PBEA, BEART, BE, THGBART, THB, SUZ5LQX, MVLE2FPJ, I0KECNPS, O2SAT, FIO2  Lab Results   Component Value Date/Time    SPECIAL NOT REPORTED 12/13/2020 09:02 PM     Lab Results   Component Value Date/Time    CULTURE NORMAL RESPIRATORY MELITA MODERATE GROWTH 12/13/2020 09:02 PM       Radiology:  Xr Chest Portable    Result Date: 12/10/2020  Diffuse bilateral ill-defined linear pulmonary opacities could represent pulmonary edema or atypical pneumonia       Physical Examination:        General appearance:  alert, cooperative and no distress  Mental Status:  oriented to person, place and time and normal affect  Lungs:  clear to auscultation bilaterally, normal effort  Heart:  regular rate and rhythm, no murmur  Abdomen:  soft, nontender, nondistended, normal bowel sounds, no masses, hepatomegaly, splenomegaly  Extremities:  no edema, redness, tenderness in the calves  Skin:  no gross lesions, rashes, induration    Assessment: Hospital Problems           Last Modified POA    * (Principal) Pneumonia due to COVID-19 virus 12/10/2020 Yes    Hypothyroidism 12/10/2020 Yes    Asthma 12/10/2020 Yes    Acute hypoxemic respiratory failure ([address] due to COVID-19 pneumonia 12/10/2020 Yes          Plan:        Acute hypoxic respiratory failure secondary to covid-19 pneumonia  - Continue Decadaron, Remdesivir   - ID on board  - on nasal cannula 5L, will take him down to 3 and see how he does     Hypothyroidism  - Continue synthroid      Cough  - Robitussin prm     DVT prophylaxis     #Discharge home      Myla Murillo PA-C  12/16/2020  1:52 PM

## 2020-12-16 NOTE — DISCHARGE SUMMARY
[facility]  Office: [address], DO, Salo Pritchett, DO, Anthony Avelar, DO, Flaco Aaron Itz, DO, Anum Granados MD, Amelie Emanuel MD, Forest Black MD, Ailyn Zhao MD, Morgan Underwood MD, Regina Valerio MD, Sravani Brown MD, Rene Munroe MD, Joanie Fung MD, Bipin Hayes DO, Benita Bernal MD, Rocky Lemus MD, Media Sensor, DO, Maynor Busch MD,  Chely Null DO, Gloria Virgen MD, Priti Caballero MD, Leticia Sanford, [facility], [facility], CNP, Nuris Wu, CNP, Nanci Foley, CNS, Rafa Coronel, CNP, Maricel Sahu, CNP, Olinda Vogt, CNP, Merlinda Goring, CNP, Quinten Gutierrez, CNP, Michelle Cooper PA-C, Anh Carmichael, [facility], Tessie Mendez, CNP, Guerda Washington, CNP, Jesus Murphy, CNP, Melissa Franco, CNP, Natacha Rodriguez, [facility]   [address] [facility]    Discharge Summary     Patient ID: Lucy Gu  :  1965   MRN: [id number]     ACCOUNT:  [de-identified]   Patient's PCP: No primary care provider on file. Admit Date: 12/10/2020   Discharge Date: 2020  Length of Stay: 6  Code Status:  Full Code  Admitting Physician: Priti Caballero MD  Discharge Physician: Zaria Schaefer PA-C     Active Discharge Diagnoses:     Hospital Problem Lists:  Principal Problem:    Pneumonia due to COVID-19 virus  Active Problems:    Hypothyroidism    Asthma    Acute hypoxemic respiratory failure ([address] due to COVID-19 pneumonia  Resolved Problems:    * No resolved hospital problems. *      Admission Condition:  poor     Discharged Condition: good    Hospital Stay:     Hospital Course:       This is a 49-year-old white male who is currently incarcerated in the local half-way system. Theadorfe Tong presents with flulike symptoms with myalgias, fevers and chills, cough and shortness of breath and was concern for COVID-19 due to recent outbreak in the half-way system. Dayana Tong is found to be markedly hypoxic and required nonrebreather mask initially for stabilization in the emergency department. [facility] was tested for Covid and has tested positive and is being admitted for further management. Maki Pegueros initial treatment with high flow oxygen he has improved in the wean down to nasal cannula oxygen.  He feels less short of breath at this point. [facility] denies any other acute complaints.  No treatment was tried prior to arrival.     Treated with decadron, remdesivir, plasma.     Significant Diagnostic Studies:   Labs / Micro:  CBC:   Lab Results   Component Value Date    WBC 15.2 12/13/2020    RBC 4.26 12/13/2020    HGB 12.9 12/13/2020    HCT 39.0 12/13/2020    MCV 91.5 12/13/2020    MCH 30.3 12/13/2020    MCHC 33.1 12/13/2020    RDW 12.5 12/13/2020     12/13/2020     BMP:    Lab Results   Component Value Date    GLUCOSE 92 12/13/2020     12/13/2020    K 4.7 12/13/2020     12/13/2020    CO2 23 12/13/2020    ANIONGAP 11 12/13/2020    BUN 22 12/13/2020    CREATININE 0.88 12/13/2020    BUNCRER NOT REPORTED 12/13/2020    CALCIUM 8.4 12/13/2020    LABGLOM >60 12/13/2020    GFRAA >60 12/13/2020    GFR      12/13/2020    GFR NOT REPORTED 12/13/2020     HFP:    Lab Results   Component Value Date    PROT 6.1 12/13/2020     CMP:    Lab Results   Component Value Date    GLUCOSE 92 12/13/2020     12/13/2020    K 4.7 12/13/2020     12/13/2020    CO2 23 12/13/2020    BUN 22 12/13/2020    CREATININE 0.88 12/13/2020    ANIONGAP 11 12/13/2020    ALKPHOS 63 12/13/2020    ALT 73 12/13/2020    AST 43 12/13/2020    BILITOT 0.39 12/13/2020    LABALBU 3.0 12/13/2020    ALBUMIN 1.0 12/13/2020    LABGLOM >60 12/13/2020    GFRAA >60 12/13/2020    GFR      12/13/2020    GFR NOT REPORTED 12/13/2020    PROT 6.1 12/13/2020    CALCIUM 8.4 12/13/2020     PT/INR:    Lab Results   Component Value Date    PROTIME 10.4 12/11/2020    INR 1.0 12/11/2020     PTT:   Lab Results   Component Value Date    APTT 26.7 12/10/2020     TSH:    Lab Results   Component Value Date    TSH 1.18

## 2020-12-16 NOTE — CARE COORDINATION
Transitional planning. Sent information to [facility] for transport back to [facility]. 63 PS MD to please have [address]. [address] given to security guards. Transport will be here within 30 min.     Discharge [address] Case Management Department  Written by: Layne Meade RN    Patient Name: Robbin Traore  Attending Provider: Marc Schroeder MD  Admit Date: 12/10/2020 10:16 AM  MRN: [id number]  Account: [de-identified]                     : 1965  Discharge Date:       Disposition: [facility] with O2 @ 3.5 L/NC and security    Layne Meade RN

## 2020-12-16 NOTE — PROGRESS NOTES
[facility] -   Infectious diseases evaluation  admission date 12/10/2020    reason for consultation:   \Covid related respiratory infection    Impression :   Current:  · Covid, pneumonia  · Elevated ferritin  · Change patient  Discussion / summary of stay / plan of care   ·   Recommendations   · post remdesivir 12/10 until 12/14  · post 2 units of plasma 12/10, consented and ordered 12/10  · Ok for DC w home o2 eval    Infection Control Recommendations   · [place] Precautions  · Contact Isolation   · Airborne isolation  · Droplet Isolation      Antimicrobial Stewardship Recommendations   · Simplification of therapy  · Targeted therapy      Coordination ofOutpatient Care:   · Estimated Length of IV antimicrobials:  · Patient will need Midline / picc Catheter Insertion:   · Patient will need SNF:  · Patient will need outpatient wound care:     History of Present Illness:   Initial history:  Carly Barron is a 60y.o.-year-old male presented over the last 1 week before admission with flulike symptoms muscle aches fevers cough shortness of breath not feeling good. Patient is from [facility], history of asthma  Brought by EMS and his saturation was 80, chest x-ray showed bilateral infiltrates suggestive of Covid. Inflammatory markers elevated mostly the ferritin but his creatinine is normal    Patient states he lost the taste of the food, he has no appetite, has no diarrhea    Interval changes   Patient Vitals for the past 8 hrs:   BP Temp Temp src Resp   12/15/20 2030 129/84 97.9 °F (36.6 °C) Oral 20   12/15/20 1652 126/85 98.8 °F (37.1 °C) Oral 20     95% saturation is on nasal cannula feeling great alert appropriate.   Labs reviewed      Summary of relevant labs:  Labs:  ALT 57  AST 67  Ferritin 1,949    Procalcitonin 0.10    -15    WBC 7-15.2  Micro:  Covid +12/10  Imaging:  Chest x-ray 12/10  Diffuse bilateral ill-defined linear pulmonary opacities could represent   pulmonary edema or atypical pneumonia       I have personally reviewed the past medical history, past surgical history, medications, social history, and family history, and I haveupdated the database accordingly. Allergies:   Patient has no known allergies. Review of Systems:     Review of Systems   Constitutional: Positive for fatigue. Negative for activity change, appetite change and fever. HENT: Negative for congestion. Eyes: Negative for discharge and itching. Respiratory: Negative for apnea, cough and shortness of breath. Cardiovascular: Negative for chest pain. Gastrointestinal: Negative for abdominal distention. Endocrine: Negative for heat intolerance. Genitourinary: Negative for dysuria and flank pain. Musculoskeletal: Negative for arthralgias. Skin: Negative for color change. Allergic/Immunologic: Negative for immunocompromised state. Neurological: Negative for dizziness. Hematological: Negative for adenopathy. Psychiatric/Behavioral: Negative for agitation. Physical Examination :       Physical Exam  Constitutional:       General: He is not in acute distress. Appearance: Normal appearance. He is not ill-appearing. HENT:      Head: Normocephalic and atraumatic. Nose: Nose normal.      Mouth/Throat:      Mouth: Mucous membranes are moist.   Eyes:      General: No scleral icterus. Conjunctiva/sclera: Conjunctivae normal.      Pupils: Pupils are equal, round, and reactive to light. Neck:      Musculoskeletal: Neck supple. No neck rigidity or muscular tenderness. Cardiovascular:      Rate and Rhythm: Normal rate and regular rhythm. Heart sounds: Normal heart sounds. No murmur. Pulmonary:      Effort: No respiratory distress. Breath sounds: Normal breath sounds. Abdominal:      General: There is no distension. Palpations: Abdomen is soft.    Genitourinary:     Comments: No Mccall  Musculoskeletal:         General: No swelling or deformity. Skin:     General: Skin is warm. Coloration: Skin is not jaundiced. Neurological:      General: No focal deficit present. Mental Status: He is alert. Mental status is at baseline. Psychiatric:         Mood and Affect: Mood normal.         Thought Content:  Thought content normal.         Past Medical History:     Past Medical History:   Diagnosis Date    Asthma     Hypothyroidism        Past Surgical  History:     Past Surgical History:   Procedure Laterality Date    NOSE SURGERY      Nasal polyps       Medications:      dexamethasone  6 mg Intravenous Daily    levothyroxine  100 mcg Oral Daily    sodium chloride flush  10 mL Intravenous 2 times per day    enoxaparin  30 mg Subcutaneous BID       Social History:     Social History     Socioeconomic History    Marital status: Single     Spouse name: Not on file    Number of children: Not on file    Years of education: Not on file    Highest education level: Not on file   Occupational History    Not on file   Social Needs    Financial resource strain: Not on file    Food insecurity     Worry: Not on file     Inability: Not on file    Transportation needs     Medical: Not on file     Non-medical: Not on file   Tobacco Use    Smoking status: Unknown If Ever Smoked    Smokeless tobacco: Never Used   Substance and Sexual Activity    Alcohol use: Not Currently    Drug use: Not on file    Sexual activity: Not on file   Lifestyle    Physical activity     Days per week: Not on file     Minutes per session: Not on file    Stress: Not on file   Relationships    Social connections     Talks on phone: Not on file     Gets together: Not on file     Attends [employer] service: Not on file     Active member of club or organization: Not on file     Attends meetings of clubs or organizations: Not on file     Relationship status: Not on file    Intimate partner violence     Fear of current or ex partner: Not on file

## 2020-12-16 NOTE — PLAN OF CARE
Problem: Airway Clearance - Ineffective  Goal: Achieve or maintain patent airway  12/16/2020 0206 by Rachel Bhardwaj RN  Outcome: Ongoing  12/15/2020 1425 by Beatriz Villegas RN  Outcome: Ongoing  Note: Requiring 4L oxygen     Problem: Gas Exchange - Impaired  Goal: Absence of hypoxia  Outcome: Ongoing  Goal: Promote optimal lung function  Outcome: Ongoing     Problem: Breathing Pattern - Ineffective  Goal: Ability to achieve and maintain a regular respiratory rate  Outcome: Ongoing     Problem:  Body Temperature -  Risk of, Imbalanced  Goal: Ability to maintain a body temperature within defined limits  Outcome: Ongoing  Goal: Will regain or maintain usual level of consciousness  Outcome: Ongoing  Goal: Complications related to the disease process, condition or treatment will be avoided or minimized  Outcome: Ongoing     Problem: Isolation Precautions - Risk of Spread of Infection  Goal: Prevent transmission of infection  Outcome: Ongoing     Problem: Nutrition Deficits  Goal: Optimize nutrtional status  Outcome: Ongoing     Problem: Risk for Fluid Volume Deficit  Goal: Maintain normal heart rhythm  Outcome: Ongoing  Goal: Maintain absence of muscle cramping  Outcome: Ongoing  Goal: Maintain normal serum potassium, sodium, calcium, phosphorus, and pH  Outcome: Ongoing     Problem: Loneliness or Risk for Loneliness  Goal: Demonstrate positive use of time alone when socialization is not possible  Outcome: Ongoing     Problem: Fatigue  Goal: Verbalize increase energy and improved vitality  Outcome: Ongoing     Problem: Patient Education: Go to Patient Education Activity  Goal: Patient/Family Education  Outcome: Ongoing

## 2020-12-16 NOTE — DISCHARGE INSTR - COC
Continuity of Care Form    Patient Name: Magdalena Topete   :  1965  MRN:  [id number]    Admit date:  12/10/2020  Discharge date:  2020    Code Status Order: Full Code   Advance Directives:   Advance Care Flowsheet Documentation       Date/Time Healthcare Directive Type of Healthcare Directive Copy in [address] Agent's Name Healthcare Agent's Phone Number    [phone]  Yes, patient has an advance directive for healthcare treatment  Durable power of  for health care --  Healthcare power of   Usha [facility]  [phone]            Admitting Physician:  Mame Pitts MD  PCP: No primary care provider on file. Discharging Nurse: [facility] Unit/Room#:   Discharging Unit Phone Number: ***    Emergency Contact:   Extended Emergency Contact Information  Primary Emergency Contact: none,none  Home Phone: [phone]  Relation: Other   needed? No    Past Surgical History:  Past Surgical History:   Procedure Laterality Date    NOSE SURGERY      Nasal polyps       Immunization History: There is no immunization history on file for this patient.     Active Problems:  Patient Active Problem List   Diagnosis Code    Pneumonia due to COVID-19 virus U07.1, J12.89    Hypothyroidism E03.9    Asthma J45.909    Acute hypoxemic respiratory failure ([facility] [address] due to COVID-19 pneumonia J96.01       Isolation/Infection:   Isolation            Droplet Plus          Patient Infection Status       Infection Onset Added Last Indicated Last Indicated By Review Planned Expiration Resolved Resolved By    COVID-19 12/10/20 12/10/20 12/10/20 COVID-19 20      Resolved    COVID-19 Rule Out 12/10/20 12/10/20 12/10/20 COVID-19 (Ordered)   12/15/20 Rule-Out Test Resulted            Nurse Assessment:  Last Vital Signs: /80   Pulse 99   Temp 97.7 °F (36.5 °C) (Oral)   Resp 22   Ht 5' 10\" (1.778 m)   Wt 181 lb 6.4 oz (82.3 kg)   SpO2 93%   BMI 26.03 kg/m²     Last documented pain score (0-10 scale): Pain Level: 0  Last Weight:   Wt Readings from Last 1 Encounters:   12/14/20 181 lb 6.4 oz (82.3 kg)     Mental Status:  oriented, alert, coherent, logical, thought processes intact and able to concentrate and follow conversation    IV Access:  - None    Nursing Mobility/ADLs:  Walking   Independent  Transfer  Independent  Bathing  Independent  Dressing  Independent  Toileting  Independent  Feeding  [address]  Independent  Med Delivery   whole    Wound Care Documentation and Therapy:        Elimination:  Continence:   · Bowel: No  · Bladder: No  Urinary Catheter: None   Colostomy/Ileostomy/Ileal Conduit: No       Date of Last BM: 12/15/2020    Intake/Output Summary (Last 24 hours) at 12/16/2020 1538  Last data filed at 12/16/2020 0759  Gross per 24 hour   Intake --   Output 2410 ml   Net -2410 ml     I/O last 3 completed shifts:  In: -   Out: 2410 [Urine:2410]    Safety Concerns:     None    Impairments/Disabilities:      None    Nutrition Therapy:  Current Nutrition Therapy:   - Oral Diet:  General    Routes of Feeding: Oral  Liquids: No Restrictions  Daily Fluid Restriction: no  Last Modified Barium Swallow with Video (Video Swallowing Test): not done    Treatments at the Time of Hospital Discharge:   Respiratory Treatments: NC  Oxygen Therapy:  is on oxygen at 3 L/min per nasal cannula.   Ventilator:    - No ventilator support    Rehab Therapies: {THERAPEUTIC INTERVENTION:7834049971}  Weight Bearing Status/Restrictions: No weight bearing restirctions  Other Medical Equipment (for information only, NOT a DME order):  {EQUIPMENT:[id number]}  Other Treatments: ***    Patient's personal belongings (please select all that are sent with patient):  None    RN SIGNATURE:  Electronically signed by Glenys Judd RN on 12/16/20 at 3:42 PM EST    CASE MANAGEMENT/SOCIAL WORK SECTION    Inpatient Status Date: ***    Readmission Risk Assessment Score:  Readmission Risk              Risk of Unplanned Readmission:        10           Discharging to Facility/ Agency   · Name: [facility]  · Address:  · Phone:  · Fax:    Dialysis Facility (if applicable)   · Name:  · Address:  · Dialysis Schedule:  · Phone:  · Fax:    / signature: Electronically signed by Charmaine Medina RN on 12/16/20 at 4:17 PM EST    PHYSICIAN SECTION    Prognosis: Fair    Condition at Discharge: Stable    Rehab Potential (if transferring to Rehab): Fair    Recommended Labs or Other Treatments After Discharge:     Physician Certification: I certify the above information and transfer of Lise Muñoz  is necessary for the continuing treatment of the diagnosis listed and that he requires facility for greater 30 days.      Update Admission H&P: No change in H&P    PHYSICIAN SIGNATURE:  Electronically signed by Ciro Pallas, MD on 12/16/20 at 4:13 PM EST